# Patient Record
Sex: FEMALE | Race: WHITE | NOT HISPANIC OR LATINO | Employment: FULL TIME | URBAN - METROPOLITAN AREA
[De-identification: names, ages, dates, MRNs, and addresses within clinical notes are randomized per-mention and may not be internally consistent; named-entity substitution may affect disease eponyms.]

---

## 2017-01-22 ENCOUNTER — TELEPHONE (OUTPATIENT)
Dept: URGENT CARE | Facility: URGENT CARE | Age: 32
End: 2017-01-22

## 2017-01-22 ENCOUNTER — RADIANT APPOINTMENT (OUTPATIENT)
Dept: GENERAL RADIOLOGY | Facility: CLINIC | Age: 32
End: 2017-01-22
Attending: FAMILY MEDICINE
Payer: COMMERCIAL

## 2017-01-22 ENCOUNTER — OFFICE VISIT (OUTPATIENT)
Dept: URGENT CARE | Facility: URGENT CARE | Age: 32
End: 2017-01-22
Payer: COMMERCIAL

## 2017-01-22 VITALS
SYSTOLIC BLOOD PRESSURE: 122 MMHG | HEIGHT: 68 IN | HEART RATE: 82 BPM | DIASTOLIC BLOOD PRESSURE: 72 MMHG | OXYGEN SATURATION: 100 % | TEMPERATURE: 99.1 F

## 2017-01-22 DIAGNOSIS — M54.50 ACUTE MIDLINE LOW BACK PAIN WITHOUT SCIATICA: ICD-10-CM

## 2017-01-22 DIAGNOSIS — M54.50 ACUTE MIDLINE LOW BACK PAIN WITHOUT SCIATICA: Primary | ICD-10-CM

## 2017-01-22 PROCEDURE — 72100 X-RAY EXAM L-S SPINE 2/3 VWS: CPT

## 2017-01-22 PROCEDURE — 99213 OFFICE O/P EST LOW 20 MIN: CPT | Performed by: FAMILY MEDICINE

## 2017-01-22 RX ORDER — PHENTERMINE HYDROCHLORIDE 37.5 MG/1
37.5 TABLET ORAL
COMMUNITY
Start: 2017-01-02 | End: 2021-07-20

## 2017-01-22 RX ORDER — ACETAZOLAMIDE 250 MG/1
250 TABLET ORAL
COMMUNITY
Start: 2016-11-22 | End: 2021-07-20

## 2017-01-22 NOTE — MR AVS SNAPSHOT
After Visit Summary   1/22/2017    Gretchen Harris    MRN: [id number]           Patient Information     Date Of Birth          1985        Visit Information        Provider Department      1/22/2017 6:30 PM Severo Abdi MD [facility]        Today's Diagnoses     Acute midline low back pain without sciatica    -  1       Care Instructions    Place ice onto the painful areas of the lower back for 15 minutes at a time every 2-3 hours while awake.     Tylenol, Ibuprofen for the pain.      Place a pillow between bent knees while sleeping on your side.      follow up with the primary care provider if not better in 7-10 days.         Caring for Your Back Throughout the Day  Take care of your back throughout the day. You will likely have fewer back problems if you do. Try to warm up before you move. Shift positions often. Also do your best to form healthy habits.    Warm up for the day  Do a few slow, catlike stretches before starting your day. This simple warmup can soften your disks, stretch your back muscles, and help prevent injuries.  Shift positions often  At work and at home, change positions often. This helps keep your body from getting stiff. Stand up or lean back while you sit. If you can, get up and move every 1/2 hour.  Form healthy habits  Here are some suggestions:     Keep a healthy weight. When you weigh too much, your back is under excess strain. But losing just a few extra pounds can help a lot.    Try not to overeat. Learn about serving sizes. The size of a serving depends on the food and the food group. Many foods list serving sizes on the labels.    Handle minor aches with cold and heat. Apply cold the first 24 to 48 hours. Use heat after that. Always place a thin cloth between your skin and the source of cold or heat.    Take medicines as directed. This helps keep pain under control. Always read labels, and call your healthcare provider or pharmacist if you have any  questions.  Walk each day  A daily walk keeps your back and thigh muscles stretched and strong. This gives your back better support. Be sure to walk with your spine s three curves aligned, by keeping your head, hips, and toes connected by a vertical line.     7882-2415 The [employer]. [address] [place] [address]. All rights reserved. This information is not intended as a substitute for professional medical care. Always follow your healthcare professional's instructions.        Relieving Back Pain  Back pain is a common problem. You can strain back muscles by lifting too much weight or just by moving the wrong way. Back strain can be uncomfortable, even painful. And it can take weeks or months to improve. To help yourself feel better and prevent future back strains, try these tips.  Important Note: Do not give aspirin to children or teens without first discussing it with your healthcare provider.     Ice    Ice reduces muscle pain and swelling. It helps most during the first 24 to 48 hours after an injury.    Wrap an ice pack or a bag of frozen peas in a thin towel. (Never place ice directly on your skin.)    Place the ice where your back hurts the most.    Don t ice for more than 20 minutes at a time.    You can use ice several times a day.     Medicines  Over-the-counter pain relievers can include acetaminophen and anti-inflammatory medicines, which includes aspirin or ibuprofen. They can help ease discomfort. Some also reduce swelling.    Tell your healthcare provider about any medicines you are already taking.    Take medicines only as directed.    Heat  After the first 48 hours, heat can relax sore muscles and improve blood flow.    Try a warm bath or shower. Or use a heating pad set on low. To prevent a burn, keep a cloth between you and the heating pad.    Don t use a heating pad for more than 15 minutes at a time. Never sleep on a heating pad.    5254-6079 [employer]  [address] [place] [address]. All rights reserved. This information is not intended as a substitute for professional medical care. Always follow your healthcare professional's instructions.        Causes of Lumbar (Low Back) Pain  Low back pain can be caused by problems with any part of the lumbar spine. A disk can herniate (push out) and press on a nerve. Vertebrae can rub against each other or slip out of place. This can irritate facet joints and nerves. It can also lead to stenosis, a narrowing of the spinal canal or foramen.  Pressure from a disk  Constant wear and tear on a disk can cause it to weaken and push outward. Part of the disk may then press on nearby nerves. There are two common types of herniated disks:  Contained means the soft nucleus is protruding outward.   Extruded means the firm annulus has torn, letting the soft center squeeze through.     Pressure from bone  An unstable spine   With age, a disk may thin and wear out. Vertebrae above and below the disk may begin to touch. This can put pressure on nerves. It can also cause bone spurs (growths) to form where the bones rub together.    Stenosis results when bone spurs narrow the foramen or spinal canal. This also puts pressure on nerves. Slipping vertebrae can irritate nerves and joints. They can also worsen stenosis.    In some cases, vertebrae become unstable and slip forward. This is called spondylolisthesis.       8689-4102 The [employer]. [address] [place], [place] [address]. All rights reserved. This information is not intended as a substitute for professional medical care. Always follow your healthcare professional's instructions.              Follow-ups after your visit        Who to contact     If you have questions or need follow up information about today's clinic visit or your schedule please contact [facility] directly at [phone].  Normal or non-critical lab and imaging results will  "be communicated to you by MyChart, letter or phone within 4 business days after the clinic has received the results. If you do not hear from us within 7 days, please contact the clinic through [employer] or phone. If you have a critical or abnormal lab result, we will notify you by phone as soon as possible.  Submit refill requests through [employer] or call your pharmacy and they will forward the refill request to us. Please allow 3 business days for your refill to be completed.          Additional Information About Your Visit        Geliyoo Information     G[employer] lets you send messages to your doctor, view your test results, renew your prescriptions, schedule appointments and more. To sign up, go to [email or web address] . Click on \"Log in\" on the left side of the screen, which will take you to the Welcome page. Then click on \"Sign up Now\" on the right side of the page.     You will be asked to enter the access code listed below, as well as some personal information. Please follow the directions to create your username and password.     Your access code is: [email or web address]  Expires: 2017  7:53 PM     Your access code will  in 90 days. If you need help or a new code, please call your [facility] clinic or [phone].        Care EveryWhere ID     This is your Care EveryWhere ID. This could be used by other organizations to access your [facility] medical records  [id number]        Your Vitals Were     Pulse Temperature Height Pulse Oximetry          82 99.1  F (37.3  C) (Oral) 5' 8\" (1.727 m) 100%         Blood Pressure from Last 3 Encounters:   17 122/72   12/05/15 157/86    Weight from Last 3 Encounters:   12/05/15 282 lb (127.914 kg)                 Today's Medication Changes          These changes are accurate as of: 17  7:54 PM.  If you have any questions, ask your nurse or doctor.               Stop taking these medicines if you haven't already. Please contact your care team if you have " questions.     azithromycin 250 MG tablet   Commonly known as:  ZITHROMAX Z-ADRIAN   Stopped by:  Severo Abdi MD           Cholecalciferol 24280 UNITS Tabs   Stopped by:  Severo Abdi MD           hydrOXYzine 25 MG tablet   Commonly known as:  ATARAX   Stopped by:  Severo Abdi MD           ibuprofen 800 MG tablet   Commonly known as:  ADVIL/MOTRIN   Stopped by:  Severo Abdi MD           PLAQUENIL 200 MG tablet   Generic drug:  hydroxychloroquine   Stopped by:  Severo Abdi MD           prednisoLONE 5 MG tablet   Stopped by:  Severo Abdi MD           TOPROL XL 25 MG 24 hr tablet   Generic drug:  metoprolol   Stopped by:  Severo Abdi MD                    Primary Care Provider Office Phone # Fax #    [facility] [phone] [address] [facility] [address]        Thank you!     Thank you for choosing [facility]  for your care. Our goal is always to provide you with excellent care. Hearing back from our patients is one way we can continue to improve our services. Please take a few minutes to complete the written survey that you may receive in the mail after your visit with us. Thank you!             Your Updated Medication List - Protect others around you: Learn how to safely use, store and throw away your medicines at www.disposemymeds.org.          This list is accurate as of: 1/22/17  7:54 PM.  Always use your most recent med list.                   Brand Name Dispense Instructions for use    acetaZOLAMIDE 250 MG tablet    DIAMOX     Take 250 mg by mouth       EPIPEN 0.3 MG/0.3ML injection   Generic drug:  EPINEPHrine      Inject 0.3 mg into the muscle once as needed       phentermine 37.5 MG tablet    ADIPEX-P     Take 37.5 mg by mouth

## 2017-01-23 ENCOUNTER — TELEPHONE (OUTPATIENT)
Dept: URGENT CARE | Facility: URGENT CARE | Age: 32
End: 2017-01-23

## 2017-01-23 NOTE — PATIENT INSTRUCTIONS
Place ice onto the painful areas of the lower back for 15 minutes at a time every 2-3 hours while awake.     Tylenol, Ibuprofen for the pain.      Place a pillow between bent knees while sleeping on your side.      follow up with the primary care provider if not better in 7-10 days.         Caring for Your Back Throughout the Day  Take care of your back throughout the day. You will likely have fewer back problems if you do. Try to warm up before you move. Shift positions often. Also do your best to form healthy habits.    Warm up for the day  Do a few slow, catlike stretches before starting your day. This simple warmup can soften your disks, stretch your back muscles, and help prevent injuries.  Shift positions often  At work and at home, change positions often. This helps keep your body from getting stiff. Stand up or lean back while you sit. If you can, get up and move every 1/2 hour.  Form healthy habits  Here are some suggestions:     Keep a healthy weight. When you weigh too much, your back is under excess strain. But losing just a few extra pounds can help a lot.    Try not to overeat. Learn about serving sizes. The size of a serving depends on the food and the food group. Many foods list serving sizes on the labels.    Handle minor aches with cold and heat. Apply cold the first 24 to 48 hours. Use heat after that. Always place a thin cloth between your skin and the source of cold or heat.    Take medicines as directed. This helps keep pain under control. Always read labels, and call your healthcare provider or pharmacist if you have any questions.  Walk each day  A daily walk keeps your back and thigh muscles stretched and strong. This gives your back better support. Be sure to walk with your spine s three curves aligned, by keeping your head, hips, and toes connected by a vertical line.     7450-4285 [employer]. [address] [place] [address], [place] [address]. All rights reserved. This information  is not intended as a substitute for professional medical care. Always follow your healthcare professional's instructions.        Relieving Back Pain  Back pain is a common problem. You can strain back muscles by lifting too much weight or just by moving the wrong way. Back strain can be uncomfortable, even painful. And it can take weeks or months to improve. To help yourself feel better and prevent future back strains, try these tips.  Important Note: Do not give aspirin to children or teens without first discussing it with your healthcare provider.     Ice    Ice reduces muscle pain and swelling. It helps most during the first 24 to 48 hours after an injury.    Wrap an ice pack or a bag of frozen peas in a thin towel. (Never place ice directly on your skin.)    Place the ice where your back hurts the most.    Don t ice for more than 20 minutes at a time.    You can use ice several times a day.     Medicines  Over-the-counter pain relievers can include acetaminophen and anti-inflammatory medicines, which includes aspirin or ibuprofen. They can help ease discomfort. Some also reduce swelling.    Tell your healthcare provider about any medicines you are already taking.    Take medicines only as directed.    Heat  After the first 48 hours, heat can relax sore muscles and improve blood flow.    Try a warm bath or shower. Or use a heating pad set on low. To prevent a burn, keep a cloth between you and the heating pad.    Don t use a heating pad for more than 15 minutes at a time. Never sleep on a heating pad.    8474-1751 [employer]. [address] [place] [address], [place] [address]. All rights reserved. This information is not intended as a substitute for professional medical care. Always follow your healthcare professional's instructions.        Causes of Lumbar (Low Back) Pain  Low back pain can be caused by problems with any part of the lumbar spine. A disk can herniate (push out) and press on a nerve.  Vertebrae can rub against each other or slip out of place. This can irritate facet joints and nerves. It can also lead to stenosis, a narrowing of the spinal canal or foramen.  Pressure from a disk  Constant wear and tear on a disk can cause it to weaken and push outward. Part of the disk may then press on nearby nerves. There are two common types of herniated disks:  Contained means the soft nucleus is protruding outward.   Extruded means the firm annulus has torn, letting the soft center squeeze through.     Pressure from bone  An unstable spine   With age, a disk may thin and wear out. Vertebrae above and below the disk may begin to touch. This can put pressure on nerves. It can also cause bone spurs (growths) to form where the bones rub together.    Stenosis results when bone spurs narrow the foramen or spinal canal. This also puts pressure on nerves. Slipping vertebrae can irritate nerves and joints. They can also worsen stenosis.    In some cases, vertebrae become unstable and slip forward. This is called spondylolisthesis.       6839-0887 [employer]. [address] [place] [address], [place] [address]. All rights reserved. This information is not intended as a substitute for professional medical care. Always follow your healthcare professional's instructions.

## 2017-01-23 NOTE — TELEPHONE ENCOUNTER
"SUBJECTIVE:  I phoned the patient at around 8:30 pm today to let her know the results of the radiology report on her X-rays from today (1/22/2017).  According to the report, she has an \"L5 spondylolysis\" with \"pars defects.\"      PLAN:  I will write note for the radiology tech to make copies of the X-rays on a CD.  This will be accompanied by a copy of the radiology report.  In addition, I will write work notes with work restrictions for the patient's two jobs.      Severo Abdi MD    "

## 2017-01-23 NOTE — TELEPHONE ENCOUNTER
Called patient to let know her xray report  And work notes are ready to be picked up.  Ashley Ramsey

## 2017-01-23 NOTE — PROGRESS NOTES
"SUBJECTIVE  HPI: Gretchen Harris is a 31 year old female who presents for evaluation of constant, atraumatic midline low back pain (just above the start of the buttocks) with a sensation of knots around the lumbar spine (when the back is rubbed)  Symptoms began one week ago, have been worsening. Patient rating is 5-6 out of 10 while at rest.  Pain is located in the lumbar spine region, with radiation to none.  .  Pain is exacerbated by: bending forward, bending backward, leaning against the seat back.  Pain is relieved by: nothing[unfilled] sx include: none. .  Red flag symptoms: none. .    Past Medical History   Diagnosis Date     Connective tissue disease (H)      Conjunctival cyst of left eye      Scleritis      left eye     Unequal pupil diameter      Headache(784.0)      Current Outpatient Prescriptions   Medication Sig Dispense Refill     acetaZOLAMIDE (DIAMOX) 250 MG tablet Take 250 mg by mouth       phentermine (ADIPEX-P) 37.5 MG tablet Take 37.5 mg by mouth       EPINEPHrine (EPIPEN) 0.3 MG/0.3ML injection Inject 0.3 mg into the muscle once as needed       Social History   Substance Use Topics     Smoking status: Never Smoker      Smokeless tobacco: Not on file     Alcohol Use: Not on file     Patient teaches at [employer]    ROS:  Review of systems negative except as stated above.    OBJECTIVE:  /72 mmHg  Pulse 82  Temp(Src) 99.1  F (37.3  C) (Oral)  Ht 5' 8\" (1.727 m)  SpO2 100%  Back examination: Back symmetric, no curvature, positive findings: limitation of motion - flexion: Severe, extension: Severe, rotation: Marked and lateral bending: Marked, tenderness to palpation over the spinous processes of the lumbar spine.  I was unable to palpate masses over the lumbar spine.   [unfilled] leg raise test: negative  GENERAL APPEARANCE: healthy, alert and no distress  NEURO: Normal strength and tone, sensory exam grossly normal, mentation intact, speech normal, gait normal including " heel/toe/tandem walking, light touch normal, normal strength throughout and deep tendon reflexes 1/4 at the patellas and Achilles.   SKIN: no suspicious lesions or rashes    X-rays of the lumbar spine were within normal limits.  No obvious spondylolisthesis/degenerative changes/fractures/dislocations.    ASSESSMENT/IMPRESSION:  Acute midline low back pain without sciatica.  Unknown etiology, especially without an obvious injury. Possible small disc herniation as a cause.     PLAN:  1) Ice  2) Tylenol, ibuprofen  3) follow up with a primary care provider in 7-10 days if not better.     Severo Abdi MD

## 2017-12-17 ENCOUNTER — HEALTH MAINTENANCE LETTER (OUTPATIENT)
Age: 32
End: 2017-12-17

## 2019-05-18 ENCOUNTER — OFFICE VISIT (OUTPATIENT)
Dept: URGENT CARE | Facility: URGENT CARE | Age: 34
End: 2019-05-18
Payer: COMMERCIAL

## 2019-05-18 VITALS
OXYGEN SATURATION: 99 % | WEIGHT: 280 LBS | TEMPERATURE: 97.8 F | DIASTOLIC BLOOD PRESSURE: 77 MMHG | SYSTOLIC BLOOD PRESSURE: 127 MMHG | BODY MASS INDEX: 42.57 KG/M2 | HEART RATE: 100 BPM

## 2019-05-18 DIAGNOSIS — M79.672 HEEL PAIN, BILATERAL: Primary | ICD-10-CM

## 2019-05-18 DIAGNOSIS — M79.671 HEEL PAIN, BILATERAL: Primary | ICD-10-CM

## 2019-05-18 DIAGNOSIS — H92.03 OTALGIA, BILATERAL: ICD-10-CM

## 2019-05-18 PROCEDURE — 99213 OFFICE O/P EST LOW 20 MIN: CPT | Performed by: FAMILY MEDICINE

## 2019-05-18 RX ORDER — FUROSEMIDE 20 MG
20-40 TABLET ORAL
COMMUNITY
Start: 2019-05-10 | End: 2021-07-20

## 2019-05-18 RX ORDER — VERAPAMIL HYDROCHLORIDE 120 MG/1
120 TABLET, FILM COATED, EXTENDED RELEASE ORAL
COMMUNITY
Start: 2019-05-14 | End: 2021-07-20

## 2019-05-19 NOTE — PROGRESS NOTES
"SUBJECTIVE:   Gretchen Harris is a 33 year old female presenting with multiple complaints:  Chief Complaint   Patient presents with     Urgent Care     Ear Problem     c/o ear pain for 3 days     Foot Pain     c/o foot pain for 1 week , no injury     (1)  Outer ear canal pain, right more than left, for the past 3 days.  Feeling of being plugged, decreased hearing and feeling of \"water in my ears\".   Has not been swimming recently.  Has had swimmers ear in the past.   No fevers.  No cough or cold symptoms.  No post nasal drainage.   Had a sore throat and a canker sore of the soft palate earlier this week, seen by her PCP, but feeling much better now.    (2)  Heel pain of both feet, but worse on right foot for the past week.   Hurts when she first gets out of bed for several steps and improves over course of the day.   No injury.  No redness or swelling.  No c/o numbness/tingling.  Has been wearing flats/sandals and other non supportive footware with change in weather.        OBJECTIVE  /77   Pulse 100   Temp 97.8  F (36.6  C) (Temporal)   Wt 127 kg (280 lb)   LMP 05/11/2019   SpO2 99%   BMI 42.57 kg/m    GENERAL:  Awake, alert and interactive. No acute distress.  HEENT:   NC/AT, EOMI, clear conjunctiva.  Nose clear.  Oropharynx moist and clear.  TM's and EAC's benign.  No dry/flaking skin or sign of irritation or inflammation within the EAC's.  Pinnas benign in appearance.  FEET:  No swelling, bruising, erythema, rashes, or lesions noted.  Skin intact.  Mildly tender to anterior aspect of heel with toes in dorsiflexion.  No compromise to distal circulation.        ASSESSMENT/PLAN    ICD-10-CM    1. Heel pain, bilateral M79.671     M79.672    2. Otalgia, bilateral H92.03      Heel pain suspect for plantar fascitis with flat sandals without any cushioning or arch support.  Reviewed symptomatic cares in detail for this.     No pathology noted in either EAC and no large effusions noted behind either TM.  " Recheck if symptoms not resolving over next few days.    Patient Instructions     For the heel pain -- avoid going barefoot even in the house.   Good supportive shoes while awake.   Foot/stretch before getting out of bed if desired.   Ibuprofen or aleve (take with food).  Ice, gel insert for the shoes, avoid aggravating activities -- squatting down, etc.   Recheck with your primary provider or podiatry if not starting to improve over the next 2 weeks, sooner if any worsening symptoms develop.    For the ears.  The ibuprofen or aleve you will be taking for your heel pain will help with your discomfort.  If not resolving over the next few days or if any worsening, recheck with your primary provider.        Patient Education     Understanding Heel Pain    Your heel is the back part of your foot. A band of tissue called the plantar fascia connects the heel bone to the bones in the ball of your foot. Nerves run from the heel up the inside of your ankle and into your leg. When you feel pain in the bottom of your heel, the plantar fascia may be inflamed. Overuse, Achilles tightness, and excess body weight can cause the tissue to tear or pull away from the bone. Sometimes the inflamed plantar fascia also irritates a nerve, causing more pain.  What causes heel pain?  Wearing shoes with poor cushioning can irritate the tissue in your heel (plantar fascia). Being overweight or standing for long periods can also irritate the tissue. Running, walking, tennis, and other sports that put stress on the heels can cause tiny tears in the tissue. If your lower leg muscles are tight, this is more likely to happen. A tight Achilles tendon will also contribute to heel pain.  Symptoms  You may feel pain on the bottom or on the inside edge of your heel. The pain may be sharp when you get out of bed or when you stand up after sitting for a while. You may feel a dull ache in your heel after you ve been standing for a long time on a hard  surface. Running can also cause a dull ache.  Preventing future problems  To prevent future heel pain, wear shoes with well-cushioned heels. And do exercises prescribed by your healthcare provider to stretch the plantar fascia and the muscles in the lower leg.   Date Last Reviewed: 10/1/2017    2541-2849 [employer]. [address] [place] [address], [place] [address]. All rights reserved. This information is not intended as a substitute for professional medical care. Always follow your healthcare professional's instructions.

## 2019-05-19 NOTE — PATIENT INSTRUCTIONS
For the heel pain -- avoid going barefoot even in the house.   Good supportive shoes while awake.   Foot/stretch before getting out of bed if desired.   Ibuprofen or aleve (take with food).  Ice, gel insert for the shoes, avoid aggravating activities -- squatting down, etc.   Recheck with your primary provider or podiatry if not starting to improve over the next 2 weeks, sooner if any worsening symptoms develop.    For the ears.  The ibuprofen or aleve you will be taking for your heel pain will help with your discomfort.  If not resolving over the next few days or if any worsening, recheck with your primary provider.        Patient Education     Understanding Heel Pain    Your heel is the back part of your foot. A band of tissue called the plantar fascia connects the heel bone to the bones in the ball of your foot. Nerves run from the heel up the inside of your ankle and into your leg. When you feel pain in the bottom of your heel, the plantar fascia may be inflamed. Overuse, Achilles tightness, and excess body weight can cause the tissue to tear or pull away from the bone. Sometimes the inflamed plantar fascia also irritates a nerve, causing more pain.  What causes heel pain?  Wearing shoes with poor cushioning can irritate the tissue in your heel (plantar fascia). Being overweight or standing for long periods can also irritate the tissue. Running, walking, tennis, and other sports that put stress on the heels can cause tiny tears in the tissue. If your lower leg muscles are tight, this is more likely to happen. A tight Achilles tendon will also contribute to heel pain.  Symptoms  You may feel pain on the bottom or on the inside edge of your heel. The pain may be sharp when you get out of bed or when you stand up after sitting for a while. You may feel a dull ache in your heel after you ve been standing for a long time on a hard surface. Running can also cause a dull ache.  Preventing future problems  To prevent  future heel pain, wear shoes with well-cushioned heels. And do exercises prescribed by your healthcare provider to stretch the plantar fascia and the muscles in the lower leg.   Date Last Reviewed: 10/1/2017    2542-9062 [employer] [address] [place] [address], [place] [address]. All rights reserved. This information is not intended as a substitute for professional medical care. Always follow your healthcare professional's instructions.

## 2020-01-31 ENCOUNTER — APPOINTMENT (OUTPATIENT)
Dept: GENERAL RADIOLOGY | Facility: CLINIC | Age: 35
End: 2020-01-31
Attending: PHYSICIAN ASSISTANT
Payer: COMMERCIAL

## 2020-01-31 ENCOUNTER — HOSPITAL ENCOUNTER (EMERGENCY)
Facility: CLINIC | Age: 35
Discharge: HOME | End: 2020-02-01
Attending: PHYSICIAN ASSISTANT | Admitting: PHYSICIAN ASSISTANT
Payer: COMMERCIAL

## 2020-01-31 DIAGNOSIS — J11.1 INFLUENZA-LIKE ILLNESS: ICD-10-CM

## 2020-01-31 LAB
ANION GAP SERPL CALCULATED.3IONS-SCNC: 3 MMOL/L (ref 3–14)
BASOPHILS # BLD AUTO: 0 10E9/L (ref 0–0.2)
BASOPHILS NFR BLD AUTO: 0.4 %
BUN SERPL-MCNC: 15 MG/DL (ref 7–30)
CALCIUM SERPL-MCNC: 9 MG/DL (ref 8.5–10.1)
CHLORIDE SERPL-SCNC: 111 MMOL/L (ref 94–109)
CO2 SERPL-SCNC: 27 MMOL/L (ref 20–32)
CREAT SERPL-MCNC: 0.73 MG/DL (ref 0.52–1.04)
CRP SERPL-MCNC: 104 MG/L (ref 0–8)
DEPRECATED S PYO AG THROAT QL EIA: NORMAL
DIFFERENTIAL METHOD BLD: NORMAL
EOSINOPHIL NFR BLD AUTO: 2.1 %
ERYTHROCYTE [DISTWIDTH] IN BLOOD BY AUTOMATED COUNT: 14.9 % (ref 10–15)
FLUAV+FLUBV AG SPEC QL: NEGATIVE
FLUAV+FLUBV AG SPEC QL: NEGATIVE
GFR SERPL CREATININE-BSD FRML MDRD: >90 ML/MIN/{1.73_M2}
GLUCOSE SERPL-MCNC: 103 MG/DL (ref 70–99)
HCT VFR BLD AUTO: 39.3 % (ref 35–47)
HGB BLD-MCNC: 12.5 G/DL (ref 11.7–15.7)
IMM GRANULOCYTES # BLD: 0 10E9/L (ref 0–0.4)
IMM GRANULOCYTES NFR BLD: 0.4 %
LYMPHOCYTES # BLD AUTO: 2.2 10E9/L (ref 0.8–5.3)
LYMPHOCYTES NFR BLD AUTO: 32.3 %
MCH RBC QN AUTO: 26.7 PG (ref 26.5–33)
MCHC RBC AUTO-ENTMCNC: 31.8 G/DL (ref 31.5–36.5)
MCV RBC AUTO: 84 FL (ref 78–100)
MONOCYTES # BLD AUTO: 0.7 10E9/L (ref 0–1.3)
MONOCYTES NFR BLD AUTO: 10.4 %
NEUTROPHILS # BLD AUTO: 3.7 10E9/L (ref 1.6–8.3)
NEUTROPHILS NFR BLD AUTO: 54.4 %
NRBC # BLD AUTO: 0 10*3/UL
NRBC BLD AUTO-RTO: 0 /100
PLATELET # BLD AUTO: 274 10E9/L (ref 150–450)
POTASSIUM SERPL-SCNC: 4.2 MMOL/L (ref 3.4–5.3)
RBC # BLD AUTO: 4.69 10E12/L (ref 3.8–5.2)
SODIUM SERPL-SCNC: 141 MMOL/L (ref 133–144)
SPECIMEN SOURCE: NORMAL
SPECIMEN SOURCE: NORMAL
WBC # BLD AUTO: 6.8 10E9/L (ref 4–11)

## 2020-01-31 PROCEDURE — 99284 EMERGENCY DEPT VISIT MOD MDM: CPT | Mod: 25 | Performed by: PHYSICIAN ASSISTANT

## 2020-01-31 PROCEDURE — 86140 C-REACTIVE PROTEIN: CPT | Performed by: PHYSICIAN ASSISTANT

## 2020-01-31 PROCEDURE — 87081 CULTURE SCREEN ONLY: CPT | Performed by: PHYSICIAN ASSISTANT

## 2020-01-31 PROCEDURE — 85025 COMPLETE CBC W/AUTO DIFF WBC: CPT | Performed by: PHYSICIAN ASSISTANT

## 2020-01-31 PROCEDURE — 80048 BASIC METABOLIC PNL TOTAL CA: CPT | Performed by: PHYSICIAN ASSISTANT

## 2020-01-31 PROCEDURE — 25000132 ZZH RX MED GY IP 250 OP 250 PS 637: Performed by: PHYSICIAN ASSISTANT

## 2020-01-31 PROCEDURE — 87804 INFLUENZA ASSAY W/OPTIC: CPT | Performed by: PHYSICIAN ASSISTANT

## 2020-01-31 PROCEDURE — 87880 STREP A ASSAY W/OPTIC: CPT | Performed by: PHYSICIAN ASSISTANT

## 2020-01-31 PROCEDURE — 99284 EMERGENCY DEPT VISIT MOD MDM: CPT | Mod: Z6 | Performed by: PHYSICIAN ASSISTANT

## 2020-01-31 PROCEDURE — 71046 X-RAY EXAM CHEST 2 VIEWS: CPT | Mod: TC

## 2020-01-31 RX ORDER — ACETAMINOPHEN 325 MG/1
975 TABLET ORAL ONCE
Status: COMPLETED | OUTPATIENT
Start: 2020-01-31 | End: 2020-01-31

## 2020-01-31 RX ADMIN — ACETAMINOPHEN 975 MG: 325 TABLET, FILM COATED ORAL at 23:24

## 2020-01-31 NOTE — ED AVS SNAPSHOT
[facility] Emergency Department  [address] [place] [address]  Phone:  [phone]  Fax:  [phone]                                    Gretchen Harris   MRN: [id number]    Department:  [facility] Emergency Department   Date of Visit:  1/31/2020           After Visit Summary Signature Page    I have received my discharge instructions, and my questions have been answered. I have discussed any challenges I see with this plan with the nurse or doctor.    ..........................................................................................................................................  Patient/Patient Representative Signature      ..........................................................................................................................................  Patient Representative Print Name and Relationship to Patient    ..................................................               ................................................  Date                                   Time    ..........................................................................................................................................  Reviewed by Signature/Title    ...................................................              ..............................................  Date                                               Time          22EPIC Rev 08/18

## 2020-02-01 VITALS
OXYGEN SATURATION: 95 % | WEIGHT: 193 LBS | RESPIRATION RATE: 18 BRPM | DIASTOLIC BLOOD PRESSURE: 88 MMHG | HEART RATE: 92 BPM | TEMPERATURE: 98.7 F | BODY MASS INDEX: 29.35 KG/M2 | SYSTOLIC BLOOD PRESSURE: 131 MMHG

## 2020-02-01 LAB
ALBUMIN UR-MCNC: NEGATIVE MG/DL
AMORPH CRY #/AREA URNS HPF: ABNORMAL /HPF
APPEARANCE UR: ABNORMAL
BACTERIA #/AREA URNS HPF: ABNORMAL /HPF
BILIRUB UR QL STRIP: NEGATIVE
COLOR UR AUTO: YELLOW
GLUCOSE UR STRIP-MCNC: NEGATIVE MG/DL
HGB UR QL STRIP: NEGATIVE
KETONES UR STRIP-MCNC: NEGATIVE MG/DL
LEUKOCYTE ESTERASE UR QL STRIP: NEGATIVE
MUCOUS THREADS #/AREA URNS LPF: PRESENT /LPF
NITRATE UR QL: NEGATIVE
PH UR STRIP: 7 PH (ref 5–7)
RBC #/AREA URNS AUTO: 0 /HPF (ref 0–2)
SOURCE: ABNORMAL
SP GR UR STRIP: 1.02 (ref 1–1.03)
SQUAMOUS #/AREA URNS AUTO: 6 /HPF (ref 0–1)
UROBILINOGEN UR STRIP-MCNC: 0 MG/DL (ref 0–2)
WBC #/AREA URNS AUTO: 4 /HPF (ref 0–5)

## 2020-02-01 PROCEDURE — 81001 URINALYSIS AUTO W/SCOPE: CPT | Performed by: PHYSICIAN ASSISTANT

## 2020-02-01 NOTE — ED PROVIDER NOTES
History     Chief Complaint   Patient presents with     Fever     Headache     HPI  Gretchen Harris is a 34 year old female who presents for evaluation of an ongoing cough for the past 6 weeks.  Developed a fever up to 102 yesterday associated with a generalized pounding headache which she rates 5 on a scale of 10.  Has had some neck discomfort off and on for the past 2 days which was worse with head movement.  Generalized myalgias have been an issue as well.  Cough has been persistent for the past 6 weeks.  Treated for bronchitis with antibiotics without initial improvement.  She thought that the cough was improving over the past 1 week, but now she has had escalation of other symptoms as well.  She reports finishing oral antibiotics 3 days ago for swimmer's ear.  This was given at an outside clinic, and I cannot review what was provided.  She has treated her fever and myalgias with ibuprofen with improvement.  Reports a past history of chronic/recurrent headaches treated by neurology.  She denies any upper extremity numbness or tingling.  No weakness.  No recent travel.  No ill contacts.        Allergies:  Allergies   Allergen Reactions     Amoxicillin Hives and Swelling     Clindamycin Hives, Swelling, Anaphylaxis and Rash     Tape [Adhesive Tape]        Problem List:    There are no active problems to display for this patient.       Past Medical History:    Past Medical History:   Diagnosis Date     Conjunctival cyst of left eye      Connective tissue disease (H)      Headache(784.0)      Scleritis      Unequal pupil diameter        Past Surgical History:    Past Surgical History:   Procedure Laterality Date     DENTAL SURGERY      wisdom teeth extr     TONSILLECTOMY & ADENOIDECTOMY  02/16/2011       Family History:    Family History   Problem Relation Age of Onset     Diabetes Maternal Grandfather      Dementia Maternal Grandfather      Thyroid Disease Maternal Grandmother         hyperthyroid     Chronic  Obstructive Pulmonary Disease Maternal Grandmother         smoker     Gallbladder Disease Maternal Grandmother      Aneurysm Paternal Grandmother      Aneurysm Paternal Grandfather      Thyroid Disease Maternal Aunt         hypothyroid       Social History:  Marital Status:  Single [1]  Social History     Tobacco Use     Smoking status: Never Smoker     Smokeless tobacco: Never Used   Substance Use Topics     Alcohol use: Not Currently     Drug use: Never        Medications:    acetaZOLAMIDE (DIAMOX) 250 MG tablet  EPINEPHrine (EPIPEN) 0.3 MG/0.3ML injection  furosemide (LASIX) 20 MG tablet  phentermine (ADIPEX-P) 37.5 MG tablet  verapamil ER (CALAN-SR) 120 MG CR tablet          Review of Systems   All other systems reviewed and are negative.      Physical Exam   BP: (!) 158/85  Pulse: 99  Heart Rate: 99  Temp: 98.7  F (37.1  C)  Resp: 18  Weight: 87.5 kg (193 lb)  SpO2: 98 %      Physical Exam  Vitals signs and nursing note reviewed.   Constitutional:       General: She is not in acute distress.     Appearance: She is not diaphoretic.   HENT:      Head: Normocephalic and atraumatic.      Right Ear: Tympanic membrane, ear canal and external ear normal.      Left Ear: Tympanic membrane, ear canal and external ear normal.      Nose: Congestion present. No rhinorrhea.      Mouth/Throat:      Mouth: Mucous membranes are moist.      Pharynx: No oropharyngeal exudate or posterior oropharyngeal erythema.   Eyes:      General: No scleral icterus.        Right eye: No discharge.         Left eye: No discharge.      Conjunctiva/sclera: Conjunctivae normal.      Pupils: Pupils are equal, round, and reactive to light.   Neck:      Musculoskeletal: Normal range of motion and neck supple. Muscular tenderness (Bilateral paracervical muscular tenderness along with bilateral trapezius discomfort.) present. No neck rigidity, pain with movement or spinous process tenderness.      Thyroid: No thyromegaly.      Meningeal: Brudzinski's  sign and Kernig's sign absent.   Cardiovascular:      Rate and Rhythm: Normal rate and regular rhythm.      Heart sounds: Normal heart sounds. No murmur.   Pulmonary:      Effort: Pulmonary effort is normal. No respiratory distress.      Breath sounds: Normal breath sounds. No wheezing or rales.   Chest:      Chest wall: No tenderness.   Abdominal:      General: Bowel sounds are normal. There is no distension.      Palpations: Abdomen is soft. There is no mass.      Tenderness: There is no abdominal tenderness. There is no right CVA tenderness, left CVA tenderness, guarding or rebound.   Musculoskeletal: Normal range of motion.         General: No swelling, tenderness, deformity or signs of injury.      Right lower leg: No edema.      Left lower leg: No edema.   Lymphadenopathy:      Cervical: No cervical adenopathy.   Skin:     General: Skin is warm and dry.      Capillary Refill: Capillary refill takes less than 2 seconds.      Coloration: Skin is not jaundiced.      Findings: No erythema or rash.   Neurological:      General: No focal deficit present.      Mental Status: She is alert and oriented to person, place, and time. Mental status is at baseline.      GCS: GCS eye subscore is 4. GCS verbal subscore is 5. GCS motor subscore is 6.      Cranial Nerves: Cranial nerves are intact. No cranial nerve deficit.      Sensory: Sensation is intact.      Motor: Motor function is intact. No weakness, tremor or abnormal muscle tone.      Coordination: Coordination is intact. Romberg sign negative. Finger-Nose-Finger Test normal. Rapid alternating movements normal.      Gait: Gait is intact.      Deep Tendon Reflexes: Reflexes are normal and symmetric.   Psychiatric:         Behavior: Behavior normal.         Thought Content: Thought content normal.         ED Course        Procedures               Critical Care time:  none               Results for orders placed or performed during the hospital encounter of 01/31/20 (from  the past 24 hour(s))   Influenza A/B antigen   Result Value Ref Range    Influenza A/B Agn Specimen Nasopharyngeal     Influenza A Negative NEG^Negative    Influenza B Negative NEG^Negative   Rapid strep screen   Result Value Ref Range    Specimen Description Throat     Rapid Strep A Screen       NEGATIVE: No Group A streptococcal antigen detected by immunoassay, await culture report.   CBC with platelets differential   Result Value Ref Range    WBC 6.8 4.0 - 11.0 10e9/L    RBC Count 4.69 3.8 - 5.2 10e12/L    Hemoglobin 12.5 11.7 - 15.7 g/dL    Hematocrit 39.3 35.0 - 47.0 %    MCV 84 78 - 100 fl    MCH 26.7 26.5 - 33.0 pg    MCHC 31.8 31.5 - 36.5 g/dL    RDW 14.9 10.0 - 15.0 %    Platelet Count 274 150 - 450 10e9/L    Diff Method Automated Method     % Neutrophils 54.4 %    % Lymphocytes 32.3 %    % Monocytes 10.4 %    % Eosinophils 2.1 %    % Basophils 0.4 %    % Immature Granulocytes 0.4 %    Nucleated RBCs 0 0 /100    Absolute Neutrophil 3.7 1.6 - 8.3 10e9/L    Absolute Lymphocytes 2.2 0.8 - 5.3 10e9/L    Absolute Monocytes 0.7 0.0 - 1.3 10e9/L    Absolute Basophils 0.0 0.0 - 0.2 10e9/L    Abs Immature Granulocytes 0.0 0 - 0.4 10e9/L    Absolute Nucleated RBC 0.0    Basic metabolic panel   Result Value Ref Range    Sodium 141 133 - 144 mmol/L    Potassium 4.2 3.4 - 5.3 mmol/L    Chloride 111 (H) 94 - 109 mmol/L    Carbon Dioxide 27 20 - 32 mmol/L    Anion Gap 3 3 - 14 mmol/L    Glucose 103 (H) 70 - 99 mg/dL    Urea Nitrogen 15 7 - 30 mg/dL    Creatinine 0.73 0.52 - 1.04 mg/dL    GFR Estimate >90 >60 mL/min/[1.73_m2]    GFR Estimate If Black >90 >60 mL/min/[1.73_m2]    Calcium 9.0 8.5 - 10.1 mg/dL   CRP inflammation   Result Value Ref Range    CRP Inflammation 104.0 (H) 0.0 - 8.0 mg/L   XR Chest 2 Views    Narrative    EXAM: XR CHEST 2 VW  LOCATION: [facility]  DATE/TIME: 1/31/2020 11:27 PM    INDICATION: Cough for 6 weeks. Fever.  COMPARISON: None.    FINDINGS: The heart size is normal. The lungs  are clear. No pneumothorax or pleural effusion.      Impression    IMPRESSION: No acute abnormality.   UA reflex to Microscopic and Culture   Result Value Ref Range    Color Urine Yellow     Appearance Urine Cloudy     Glucose Urine Negative NEG^Negative mg/dL    Bilirubin Urine Negative NEG^Negative    Ketones Urine Negative NEG^Negative mg/dL    Specific Gravity Urine 1.019 1.003 - 1.035    Blood Urine Negative NEG^Negative    pH Urine 7.0 5.0 - 7.0 pH    Protein Albumin Urine Negative NEG^Negative mg/dL    Urobilinogen mg/dL 0.0 0.0 - 2.0 mg/dL    Nitrite Urine Negative NEG^Negative    Leukocyte Esterase Urine Negative NEG^Negative    Source Midstream Urine     RBC Urine 0 0 - 2 /HPF    WBC Urine 4 0 - 5 /HPF    Bacteria Urine Few (A) NEG^Negative /HPF    Squamous Epithelial /HPF Urine 6 (H) 0 - 1 /HPF    Mucous Urine Present (A) NEG^Negative /LPF    Amorphous Crystals Many (A) NEG^Negative /HPF       Medications   acetaminophen (TYLENOL) tablet 975 mg (975 mg Oral Given 1/31/20 7861)       Assessments & Plan (with Medical Decision Making)  Influenza-like illness     34 year old female presents for evaluation of headache, neck discomfort, fever up to 102.0, and generalized myalgia for the past 2 days associated with an ongoing cough for the past 6 weeks.  Just finished antibiotics 3 days ago for a case of otitis externa per patient report.  Taking ibuprofen as needed for symptoms.  No recent travel.  No other ill contacts.  On exam blood pressure 158/85, temperature 98.7, respiration 18, and oxygen saturation 98% on room air.  Patient appears well.   Negative Brudzinski's and Kernig sign.  She does have some para vertebral cervical muscular tenderness and bilateral trapezius muscle tenderness upon palpation.  No sign of nuchal rigidity.  Concern for some coarse breath sounds in the left lower posterior and possible rhonchi with deep inspiration.  Remainder the exam otherwise normal.  Chest x-ray without  infiltrate.  White blood cell count normal and actually at the low end of 6800 with neutrophil of 54%, lymphocyte 32% and monocyte 10%.  Influenza and rapid strep test negative.  Basic metabolic panel all within normal limits and CRP elevated at 104.  Given her laboratory, x-ray, exam findings, I am most concerned about a viral illness.  This is likely what is causing her CRP elevation.  She does not have any nuchal rigidity.  Symptoms and exam not consistent with meningitis.  No infiltrate on chest x-ray.  Urinalysis without nitrite or leukocyte esterase.  Appears to be contaminated specimen with bacteria, squamous epithelial cells, and mucus.  Patient was reassured.  It appears that she has come down with an influenza-like illness in the past 2 days.  Conservative care management discussed.  Push clear fluids.  Increase rest.  Ibuprofen or Tylenol as needed for symptoms.  Indications for return reviewed.  Patient was in agreement with this plan and was suitable for discharge.     I have reviewed the nursing notes.    I have reviewed the findings, diagnosis, plan and need for follow up with the patient.       Discharge Medication List as of 2/1/2020 12:40 AM          Final diagnoses:   Influenza-like illness       Disclaimer: This note consists of symbols derived from keyboarding, dictation and/or voice recognition software. As a result, there may be errors in the script that have gone undetected. Please consider this when interpreting information found in this chart.      1/31/2020   Chava Parmar PA-C   [facility] EMERGENCY DEPARTMENT     Chava Parmar PA-C  02/01/20 0115

## 2020-02-01 NOTE — DISCHARGE INSTRUCTIONS
It was a pleasure working with you today!  I hope your condition improves rapidly!     All of your testing was reassuring this evening.  Based on your laboratory testing and current symptoms, I am concerned that you have a viral illness.  Your symptoms sound like influenza, and your nasal swab could be a false negative.  Unfortunately, there are no cures for influenza.  Your immune system will have to fight this off over the coming days.  You are contagious while you are experiencing a fever.  It is okay to treat your fever with alternating Tylenol and ibuprofen.  Make sure you are drinking at least 8 glasses of water per day to maintain adequate hydration.

## 2020-02-03 LAB
BACTERIA SPEC CULT: NORMAL
SPECIMEN SOURCE: NORMAL

## 2020-02-03 NOTE — RESULT ENCOUNTER NOTE
Final Beta strep group A r/o culture is NEGATIVE for Group A streptococcus.    No treatment or change in treatment per [place] Strep protocol.

## 2020-03-02 ENCOUNTER — HEALTH MAINTENANCE LETTER (OUTPATIENT)
Age: 35
End: 2020-03-02

## 2020-12-20 ENCOUNTER — HEALTH MAINTENANCE LETTER (OUTPATIENT)
Age: 35
End: 2020-12-20

## 2021-04-11 ENCOUNTER — HOSPITAL ENCOUNTER (EMERGENCY)
Facility: CLINIC | Age: 36
Discharge: HOME | End: 2021-04-12
Attending: PHYSICIAN ASSISTANT | Admitting: PHYSICIAN ASSISTANT
Payer: COMMERCIAL

## 2021-04-11 ENCOUNTER — APPOINTMENT (OUTPATIENT)
Dept: CT IMAGING | Facility: CLINIC | Age: 36
End: 2021-04-11
Attending: PHYSICIAN ASSISTANT
Payer: COMMERCIAL

## 2021-04-11 DIAGNOSIS — R00.2 PALPITATIONS: ICD-10-CM

## 2021-04-11 DIAGNOSIS — R07.89 ATYPICAL CHEST PAIN: ICD-10-CM

## 2021-04-11 LAB
ALBUMIN SERPL-MCNC: 3.3 G/DL (ref 3.4–5)
ALP SERPL-CCNC: 68 U/L (ref 40–150)
ALT SERPL W P-5'-P-CCNC: 33 U/L (ref 0–50)
ANION GAP SERPL CALCULATED.3IONS-SCNC: 7 MMOL/L (ref 3–14)
AST SERPL W P-5'-P-CCNC: 18 U/L (ref 0–45)
BASOPHILS # BLD AUTO: 0 10E9/L (ref 0–0.2)
BASOPHILS NFR BLD AUTO: 0.4 %
BILIRUB SERPL-MCNC: 0.3 MG/DL (ref 0.2–1.3)
BUN SERPL-MCNC: 22 MG/DL (ref 7–30)
CALCIUM SERPL-MCNC: 9 MG/DL (ref 8.5–10.1)
CHLORIDE SERPL-SCNC: 109 MMOL/L (ref 94–109)
CO2 SERPL-SCNC: 24 MMOL/L (ref 20–32)
CREAT SERPL-MCNC: 0.83 MG/DL (ref 0.52–1.04)
D DIMER PPP FEU-MCNC: 1.2 UG/ML FEU (ref 0–0.5)
DIFFERENTIAL METHOD BLD: NORMAL
EOSINOPHIL # BLD AUTO: 0.2 10E9/L (ref 0–0.7)
EOSINOPHIL NFR BLD AUTO: 2.6 %
ERYTHROCYTE [DISTWIDTH] IN BLOOD BY AUTOMATED COUNT: 14.5 % (ref 10–15)
GFR SERPL CREATININE-BSD FRML MDRD: >90 ML/MIN/{1.73_M2}
GLUCOSE SERPL-MCNC: 89 MG/DL (ref 70–99)
HCT VFR BLD AUTO: 35.4 % (ref 35–47)
HGB BLD-MCNC: 11.7 G/DL (ref 11.7–15.7)
IMM GRANULOCYTES # BLD: 0 10E9/L (ref 0–0.4)
IMM GRANULOCYTES NFR BLD: 0.1 %
LIPASE SERPL-CCNC: 244 U/L (ref 73–393)
LYMPHOCYTES # BLD AUTO: 2.2 10E9/L (ref 0.8–5.3)
LYMPHOCYTES NFR BLD AUTO: 29.3 %
MCH RBC QN AUTO: 27.1 PG (ref 26.5–33)
MCHC RBC AUTO-ENTMCNC: 33.1 G/DL (ref 31.5–36.5)
MCV RBC AUTO: 82 FL (ref 78–100)
MONOCYTES # BLD AUTO: 0.9 10E9/L (ref 0–1.3)
MONOCYTES NFR BLD AUTO: 12 %
NEUTROPHILS # BLD AUTO: 4.1 10E9/L (ref 1.6–8.3)
NEUTROPHILS NFR BLD AUTO: 55.6 %
NRBC # BLD AUTO: 0 10*3/UL
NRBC BLD AUTO-RTO: 0 /100
PLATELET # BLD AUTO: 227 10E9/L (ref 150–450)
POTASSIUM SERPL-SCNC: 3.6 MMOL/L (ref 3.4–5.3)
PROT SERPL-MCNC: 7.3 G/DL (ref 6.8–8.8)
RBC # BLD AUTO: 4.32 10E12/L (ref 3.8–5.2)
SODIUM SERPL-SCNC: 140 MMOL/L (ref 133–144)
TROPONIN I SERPL-MCNC: <0.015 UG/L (ref 0–0.04)
WBC # BLD AUTO: 7.4 10E9/L (ref 4–11)

## 2021-04-11 PROCEDURE — 85379 FIBRIN DEGRADATION QUANT: CPT | Performed by: PHYSICIAN ASSISTANT

## 2021-04-11 PROCEDURE — 85025 COMPLETE CBC W/AUTO DIFF WBC: CPT | Performed by: PHYSICIAN ASSISTANT

## 2021-04-11 PROCEDURE — 84703 CHORIONIC GONADOTROPIN ASSAY: CPT | Performed by: PHYSICIAN ASSISTANT

## 2021-04-11 PROCEDURE — 93005 ELECTROCARDIOGRAM TRACING: CPT | Performed by: PHYSICIAN ASSISTANT

## 2021-04-11 PROCEDURE — 84443 ASSAY THYROID STIM HORMONE: CPT | Performed by: PHYSICIAN ASSISTANT

## 2021-04-11 PROCEDURE — 99285 EMERGENCY DEPT VISIT HI MDM: CPT | Mod: 25 | Performed by: PHYSICIAN ASSISTANT

## 2021-04-11 PROCEDURE — 93010 ELECTROCARDIOGRAM REPORT: CPT | Performed by: PHYSICIAN ASSISTANT

## 2021-04-11 PROCEDURE — 83690 ASSAY OF LIPASE: CPT | Performed by: PHYSICIAN ASSISTANT

## 2021-04-11 PROCEDURE — 71275 CT ANGIOGRAPHY CHEST: CPT

## 2021-04-11 PROCEDURE — 84484 ASSAY OF TROPONIN QUANT: CPT | Performed by: PHYSICIAN ASSISTANT

## 2021-04-11 PROCEDURE — 80053 COMPREHEN METABOLIC PANEL: CPT | Performed by: PHYSICIAN ASSISTANT

## 2021-04-11 RX ORDER — IOPAMIDOL 755 MG/ML
100 INJECTION, SOLUTION INTRAVASCULAR ONCE
Status: COMPLETED | OUTPATIENT
Start: 2021-04-11 | End: 2021-04-12

## 2021-04-12 VITALS
WEIGHT: 289 LBS | OXYGEN SATURATION: 97 % | RESPIRATION RATE: 16 BRPM | SYSTOLIC BLOOD PRESSURE: 142 MMHG | DIASTOLIC BLOOD PRESSURE: 87 MMHG | TEMPERATURE: 97.7 F | BODY MASS INDEX: 43.94 KG/M2 | HEART RATE: 90 BPM

## 2021-04-12 LAB
HCG SERPL QL: NEGATIVE
TSH SERPL DL<=0.005 MIU/L-ACNC: 2.59 MU/L (ref 0.4–4)

## 2021-04-12 PROCEDURE — 250N000011 HC RX IP 250 OP 636: Performed by: PHYSICIAN ASSISTANT

## 2021-04-12 PROCEDURE — 250N000009 HC RX 250: Performed by: PHYSICIAN ASSISTANT

## 2021-04-12 RX ADMIN — IOPAMIDOL 90 ML: 755 INJECTION, SOLUTION INTRAVENOUS at 00:43

## 2021-04-12 RX ADMIN — SODIUM CHLORIDE 70 ML: 9 INJECTION, SOLUTION INTRAVENOUS at 00:44

## 2021-04-12 NOTE — ED PROVIDER NOTES
"  History     Chief Complaint   Patient presents with     Chest Pain     HPI  Gretchen Dodd is a 35 year old female who who presents for evaluation of sudden onset of tightness in her left chest when she was making dinner this evening, about 4 hours prior to arrival.  She states that it felt \"like a squeeze or a twinge.  \"She reports that the discomfort comes and goes.  Rates it 4-5 on a scale of 10 and stabbing in nature when she does feel it.  There is a constant ache in the area in between the episodes.  States that it starts in the left upper chest and radiates across the front of her chest and then radiates between her scapulas.  Worse with a deep breath.  Feels like a pressure.  She states that 3 different times over the past 1 week she has had her smart watch alarm stating that she was potentially in atrial fibrillation.  She has not checked her pulse any of these times.  She was actually asymptomatic when her watch was alarming.  On further questioning she does state that she has had bilateral lower extremity edema for the past 1 year.  Apparently had an evaluation for this.  She did receive her second [place] Covid vaccine 2 days ago and has had myalgias, fever to 102, and generalized fatigue ever since her Covid vaccine.  She denies any cough or dyspnea.  She has been taking Tylenol and ibuprofen for her symptoms.  Denies prior history of DVT or PE.  No recent trauma or injury to her chest.  Denies any reflux symptoms or other GI symptomatology.        Allergies:  Allergies   Allergen Reactions     Amoxicillin Hives and Swelling     Clindamycin Hives, Swelling, Anaphylaxis and Rash     Tape [Adhesive Tape]        Problem List:    There are no active problems to display for this patient.       Past Medical History:    Past Medical History:   Diagnosis Date     Conjunctival cyst of left eye      Connective tissue disease (H)      Headache(784.0)      Scleritis      Unequal pupil diameter        Past " Surgical History:    Past Surgical History:   Procedure Laterality Date     DENTAL SURGERY      wisdom teeth extr     TONSILLECTOMY & ADENOIDECTOMY  02/16/2011       Family History:    Family History   Problem Relation Age of Onset     Diabetes Maternal Grandfather      Dementia Maternal Grandfather      Thyroid Disease Maternal Grandmother         hyperthyroid     Chronic Obstructive Pulmonary Disease Maternal Grandmother         smoker     Gallbladder Disease Maternal Grandmother      Aneurysm Paternal Grandmother      Aneurysm Paternal Grandfather      Thyroid Disease Maternal Aunt         hypothyroid       Social History:  Marital Status:   [2]  Social History     Tobacco Use     Smoking status: Never Smoker     Smokeless tobacco: Never Used   Substance Use Topics     Alcohol use: Not Currently     Drug use: Never        Medications:    acetaZOLAMIDE (DIAMOX) 250 MG tablet  EPINEPHrine (EPIPEN) 0.3 MG/0.3ML injection  furosemide (LASIX) 20 MG tablet  phentermine (ADIPEX-P) 37.5 MG tablet  verapamil ER (CALAN-SR) 120 MG CR tablet          Review of Systems   All other systems reviewed and are negative.      Physical Exam   BP: (!) 166/99  Pulse: 92  Temp: 99.1  F (37.3  C)  Resp: 18  Weight: 131.1 kg (289 lb)  SpO2: 99 %      Physical Exam  Vitals signs and nursing note reviewed.   Constitutional:       General: She is not in acute distress.     Appearance: She is not diaphoretic.   HENT:      Head: Normocephalic and atraumatic.      Right Ear: External ear normal.      Left Ear: External ear normal.      Nose: Nose normal.      Mouth/Throat:      Pharynx: No oropharyngeal exudate.   Eyes:      General: No scleral icterus.        Right eye: No discharge.         Left eye: No discharge.      Conjunctiva/sclera: Conjunctivae normal.      Pupils: Pupils are equal, round, and reactive to light.   Neck:      Musculoskeletal: Normal range of motion and neck supple.      Thyroid: No thyromegaly.   Cardiovascular:       Rate and Rhythm: Normal rate and regular rhythm.      Heart sounds: Normal heart sounds. No murmur.   Pulmonary:      Effort: Pulmonary effort is normal. No respiratory distress.      Breath sounds: Normal breath sounds. No decreased breath sounds, wheezing, rhonchi or rales.   Chest:      Chest wall: No mass, deformity, tenderness, crepitus or edema. There is no dullness to percussion.   Abdominal:      General: Bowel sounds are normal. There is no distension.      Palpations: Abdomen is soft. There is no mass.      Tenderness: There is no abdominal tenderness. There is no guarding or rebound.   Musculoskeletal: Normal range of motion.         General: No tenderness or deformity.      Right lower leg: She exhibits no tenderness. No edema.      Left lower leg: She exhibits no tenderness. No edema.      Comments: Negative Dilia's sign   Lymphadenopathy:      Cervical: No cervical adenopathy.   Skin:     General: Skin is warm and dry.      Capillary Refill: Capillary refill takes less than 2 seconds.      Findings: No erythema or rash.   Neurological:      Mental Status: She is alert and oriented to person, place, and time.      Cranial Nerves: No cranial nerve deficit.   Psychiatric:         Behavior: Behavior normal.         Thought Content: Thought content normal.         ED Course        Procedures               EKG Interpretation:      Interpreted by Chava Parmar PA-C  Time reviewed: 2200  Symptoms at time of EKG: chest discomfort   Rhythm: normal sinus   Rate: normal  Axis: normal  Ectopy: none  Conduction: normal  ST Segments/ T Waves: No ST-T wave changes  Q Waves: none  Comparison to prior: No old EKG available    Clinical Impression: normal EKG          Critical Care time:  none               Results for orders placed or performed during the hospital encounter of 04/11/21 (from the past 24 hour(s))   CBC with platelets differential   Result Value Ref Range    WBC 7.4 4.0 - 11.0 10e9/L    RBC  Count 4.32 3.8 - 5.2 10e12/L    Hemoglobin 11.7 11.7 - 15.7 g/dL    Hematocrit 35.4 35.0 - 47.0 %    MCV 82 78 - 100 fl    MCH 27.1 26.5 - 33.0 pg    MCHC 33.1 31.5 - 36.5 g/dL    RDW 14.5 10.0 - 15.0 %    Platelet Count 227 150 - 450 10e9/L    Diff Method Automated Method     % Neutrophils 55.6 %    % Lymphocytes 29.3 %    % Monocytes 12.0 %    % Eosinophils 2.6 %    % Basophils 0.4 %    % Immature Granulocytes 0.1 %    Nucleated RBCs 0 0 /100    Absolute Neutrophil 4.1 1.6 - 8.3 10e9/L    Absolute Lymphocytes 2.2 0.8 - 5.3 10e9/L    Absolute Monocytes 0.9 0.0 - 1.3 10e9/L    Absolute Eosinophils 0.2 0.0 - 0.7 10e9/L    Absolute Basophils 0.0 0.0 - 0.2 10e9/L    Abs Immature Granulocytes 0.0 0 - 0.4 10e9/L    Absolute Nucleated RBC 0.0    D dimer quantitative   Result Value Ref Range    D Dimer 1.2 (H) 0.0 - 0.50 ug/ml FEU   Comprehensive metabolic panel   Result Value Ref Range    Sodium 140 133 - 144 mmol/L    Potassium 3.6 3.4 - 5.3 mmol/L    Chloride 109 94 - 109 mmol/L    Carbon Dioxide 24 20 - 32 mmol/L    Anion Gap 7 3 - 14 mmol/L    Glucose 89 70 - 99 mg/dL    Urea Nitrogen 22 7 - 30 mg/dL    Creatinine 0.83 0.52 - 1.04 mg/dL    GFR Estimate >90 >60 mL/min/[1.73_m2]    GFR Estimate If Black >90 >60 mL/min/[1.73_m2]    Calcium 9.0 8.5 - 10.1 mg/dL    Bilirubin Total 0.3 0.2 - 1.3 mg/dL    Albumin 3.3 (L) 3.4 - 5.0 g/dL    Protein Total 7.3 6.8 - 8.8 g/dL    Alkaline Phosphatase 68 40 - 150 U/L    ALT 33 0 - 50 U/L    AST 18 0 - 45 U/L   Lipase   Result Value Ref Range    Lipase 244 73 - 393 U/L   Troponin I   Result Value Ref Range    Troponin I ES <0.015 0.000 - 0.045 ug/L   HCG qualitative Blood   Result Value Ref Range    HCG Qualitative Serum Negative NEG^Negative   CT Chest Pulmonary Embolism w Contrast    Narrative    EXAM: CT CHEST PULMONARY EMBOLISM W CONTRAST  LOCATION: [facility]  DATE/TIME: 4/12/2021 12:36 AM    INDICATION: Chest pain. Elevated D-dimer.  COMPARISON: None.  TECHNIQUE:  CT chest pulmonary angiogram during arterial phase injection of IV contrast. Multiplanar reformats and MIP reconstructions were performed. Dose reduction techniques were used.   CONTRAST: 80 mL Isovue 370.    FINDINGS:  ANGIOGRAM CHEST: Pulmonary arteries are normal caliber and negative for pulmonary emboli. Thoracic aorta is negative for dissection. The heart size is normal.    LUNGS AND PLEURA: Normal.    MEDIASTINUM/AXILLAE: Normal.    CORONARY ARTERY CALCIFICATION: None.    UPPER ABDOMEN: Normal.    MUSCULOSKELETAL: Normal.      Impression    IMPRESSION:  1.  There is no pulmonary embolus, aortic aneurysm or dissection. No acute abnormality.       Medications   iopamidol (ISOVUE-370) solution 100 mL (90 mLs Intravenous Given 4/12/21 0043)   Sodium Chloride 0.9 % bag 100mL for CT scan (70 mLs Intravenous Given 4/12/21 0044)   sodium chloride (PF) 0.9% PF flush 3 mL (10 mLs Intracatheter Given 4/12/21 0042)       Assessments & Plan (with Medical Decision Making)     Atypical chest pain  Palpitations     35 year old female presents for evaluation of onset of chest discomfort about 4 hours prior to arrival when she was making dinner.  Describes as a left sided chest pain radiating across her chest and in between her scapulas 4-5 on a scale of 10 associated with increased symptoms during deep breathing.  See HPI for details.  Has been experiencing myalgias, generalized fatigue, and fever up to 102 for the past 2 days ever since receiving her second dose of Covid vaccine.  On exam blood pressure 119/65, temperature 99.1, pulse 90, respiration 20, oxygen saturation 97% on room air.  Generally healthy-appearing female no acute distress.  Exam is completely normal as noted above.  EKG performed without acute ST or T wave changes.  IV was established.  Laboratory levels display undetectable troponin, normal lipase, normal CBC, and normal comprehensive metabolic panel.  D-dimer was elevated at 1.2.  Proceeded with CT PE  study which was completely normal.  On further questioning, the patient admits to extreme stress levels recently with job stressors and the fact that she is providing care for grandparents and receiving limited help from her siblings.  She admits that this may be a component to overall symptoms.  She had onset of chest discomfort 4 hours prior to ED presentation, therefore I think a single troponin level for a person of her age and lack of risk factors is appropriate.  This is not consistent with coronary artery disease.  She certainly could have a periodic arrhythmia given that her smart watch is firing and alerting her to potential irregular or tachycardic rhythm.  Therefore, I think further work-up with a Zio patch would be indicated and I placed a referral for this.  She is changing clinics right now, and therefore will find a new PCP.  She is fully aware that she needs to see a primary care provider for follow-up about 7-9 days after she turns in the heart monitor so that she can get the results.  Strict ED return instructions reviewed with the patient.  Return if her symptoms worsen or change in any way.  She was in agreement with this plan and was suitable for discharge.     I have reviewed the nursing notes.    I have reviewed the findings, diagnosis, plan and need for follow up with the patient.       Discharge Medication List as of 4/12/2021  1:44 AM          Final diagnoses:   Atypical chest pain   Palpitations     Disclaimer: This note consists of symbols derived from keyboarding, dictation and/or voice recognition software. As a result, there may be errors in the script that have gone undetected. Please consider this when interpreting information found in this chart.      4/11/2021   [facility] EMERGENCY DEPT     Chava Parmar PA-C  04/12/21 0155

## 2021-04-12 NOTE — ED NOTES
Pt reports having her second covid shot on Friday, reports having a headache, nausea and a fever since receiving the injection. Pt states she has been alternating tylenol and ibuprofen.

## 2021-04-12 NOTE — DISCHARGE INSTRUCTIONS
It was a pleasure working with you today!  I hope your condition improves rapidly!     Thankfully, we did not find anything concerning with your evaluation today.  I would like for you to get a heart monitor set up in cardiology to make sure we fully evaluate your palpitations and irregular heartbeat that your smart watch is catching.  Please call the cardiology number tomorrow to get set up with this.  Please then schedule a primary care appointment about 7-9 days after you are scheduled to return the heart monitor to go over the results in detail.  If your symptoms greatly worsen prior to then, present to the ED for recheck.  As we discussed, please try and work on the stressors that have been bothering you to see if this potentially would make a difference.

## 2021-04-12 NOTE — ED TRIAGE NOTES
Presents to ED with concerns of chest tightness and palpitations. States off and on for the past week her apple watch has been alarming for Afib. No history. Pulse regular during triage. Reports fever as well post COVID shot on Friday.

## 2021-04-24 ENCOUNTER — HEALTH MAINTENANCE LETTER (OUTPATIENT)
Age: 36
End: 2021-04-24

## 2021-07-20 ENCOUNTER — HOSPITAL ENCOUNTER (EMERGENCY)
Facility: CLINIC | Age: 36
Discharge: HOME | End: 2021-07-20
Attending: NURSE PRACTITIONER | Admitting: NURSE PRACTITIONER
Payer: COMMERCIAL

## 2021-07-20 VITALS
RESPIRATION RATE: 16 BRPM | BODY MASS INDEX: 41.81 KG/M2 | DIASTOLIC BLOOD PRESSURE: 103 MMHG | OXYGEN SATURATION: 99 % | SYSTOLIC BLOOD PRESSURE: 148 MMHG | TEMPERATURE: 97.7 F | WEIGHT: 275 LBS | HEART RATE: 80 BPM

## 2021-07-20 DIAGNOSIS — R51.9 INTRACTABLE HEADACHE, UNSPECIFIED CHRONICITY PATTERN, UNSPECIFIED HEADACHE TYPE: ICD-10-CM

## 2021-07-20 PROCEDURE — 96365 THER/PROPH/DIAG IV INF INIT: CPT | Performed by: NURSE PRACTITIONER

## 2021-07-20 PROCEDURE — 250N000011 HC RX IP 250 OP 636: Performed by: NURSE PRACTITIONER

## 2021-07-20 PROCEDURE — 99284 EMERGENCY DEPT VISIT MOD MDM: CPT | Mod: 25 | Performed by: NURSE PRACTITIONER

## 2021-07-20 PROCEDURE — 96375 TX/PRO/DX INJ NEW DRUG ADDON: CPT | Performed by: NURSE PRACTITIONER

## 2021-07-20 PROCEDURE — 99283 EMERGENCY DEPT VISIT LOW MDM: CPT | Performed by: NURSE PRACTITIONER

## 2021-07-20 RX ORDER — ASPIRIN 81 MG/1
81 TABLET ORAL DAILY
COMMUNITY
End: 2023-02-25

## 2021-07-20 RX ORDER — DIPHENHYDRAMINE HYDROCHLORIDE 50 MG/ML
25 INJECTION INTRAMUSCULAR; INTRAVENOUS ONCE
Status: COMPLETED | OUTPATIENT
Start: 2021-07-20 | End: 2021-07-20

## 2021-07-20 RX ORDER — TOPIRAMATE 25 MG/1
25 TABLET, FILM COATED ORAL 2 TIMES DAILY
COMMUNITY
Start: 2021-07-02 | End: 2023-07-19

## 2021-07-20 RX ORDER — DEXAMETHASONE SODIUM PHOSPHATE 10 MG/ML
10 INJECTION, SOLUTION INTRAMUSCULAR; INTRAVENOUS ONCE
Status: COMPLETED | OUTPATIENT
Start: 2021-07-20 | End: 2021-07-20

## 2021-07-20 RX ORDER — KETOROLAC TROMETHAMINE 15 MG/ML
15 INJECTION, SOLUTION INTRAMUSCULAR; INTRAVENOUS ONCE
Status: DISCONTINUED | OUTPATIENT
Start: 2021-07-20 | End: 2021-07-20

## 2021-07-20 RX ORDER — MAGNESIUM SULFATE 1 G/100ML
1 INJECTION INTRAVENOUS ONCE
Status: COMPLETED | OUTPATIENT
Start: 2021-07-20 | End: 2021-07-20

## 2021-07-20 RX ORDER — KETOROLAC TROMETHAMINE 15 MG/ML
15 INJECTION, SOLUTION INTRAMUSCULAR; INTRAVENOUS ONCE
Status: COMPLETED | OUTPATIENT
Start: 2021-07-20 | End: 2021-07-20

## 2021-07-20 RX ADMIN — PROCHLORPERAZINE EDISYLATE 10 MG: 5 INJECTION INTRAMUSCULAR; INTRAVENOUS at 18:46

## 2021-07-20 RX ADMIN — DEXAMETHASONE SODIUM PHOSPHATE 10 MG: 10 INJECTION, SOLUTION INTRAMUSCULAR; INTRAVENOUS at 18:50

## 2021-07-20 RX ADMIN — KETOROLAC TROMETHAMINE 15 MG: 15 INJECTION, SOLUTION INTRAMUSCULAR; INTRAVENOUS at 18:45

## 2021-07-20 RX ADMIN — MAGNESIUM SULFATE HEPTAHYDRATE 1 G: 1 INJECTION, SOLUTION INTRAVENOUS at 18:53

## 2021-07-20 RX ADMIN — DIPHENHYDRAMINE HYDROCHLORIDE 25 MG: 50 INJECTION INTRAMUSCULAR; INTRAVENOUS at 18:43

## 2021-07-20 NOTE — ED PROVIDER NOTES
History     Chief Complaint   Patient presents with     Headache     HPI  Gretchen Dodd is a 35 year old female who presents to the emergency department for evaluation of headache.  Patient reports persistent headache for the last 2 weeks.  She has a history of chronic headaches.  This feels similar to her typical headaches with the exception that it has been persistent.  Headache is accompanied by nausea, but no vomiting.  Denies fever or chills.  She does follow with a neurologist and has been started on Topamax.  She states the Topamax also for weight loss.  She trying to get pregnant and having worked up for fertility concerns.  She was negative for PCOS but did show insulin resistance. Luous anticoagulant and anti-cardiolipin antibodies were abnormal. She has been referred to hematology.    Allergies:  Allergies   Allergen Reactions     Amoxicillin Hives and Swelling     Clindamycin Hives, Swelling, Anaphylaxis and Rash     Tape [Adhesive Tape]        Problem List:    There are no problems to display for this patient.       Past Medical History:    Past Medical History:   Diagnosis Date     Conjunctival cyst of left eye      Connective tissue disease (H)      Headache(784.0)      Scleritis      Unequal pupil diameter        Past Surgical History:    Past Surgical History:   Procedure Laterality Date     DENTAL SURGERY      wisdom teeth extr     TONSILLECTOMY & ADENOIDECTOMY  02/16/2011       Family History:    Family History   Problem Relation Age of Onset     Diabetes Maternal Grandfather      Dementia Maternal Grandfather      Thyroid Disease Maternal Grandmother         hyperthyroid     Chronic Obstructive Pulmonary Disease Maternal Grandmother         smoker     Gallbladder Disease Maternal Grandmother      Aneurysm Paternal Grandmother      Aneurysm Paternal Grandfather      Thyroid Disease Maternal Aunt         hypothyroid       Social History:  Marital Status:   [2]  Social History     Tobacco  Use     Smoking status: Never Smoker     Smokeless tobacco: Never Used   Substance Use Topics     Alcohol use: Not Currently     Drug use: Never        Medications:    aspirin 81 MG EC tablet  topiramate (TOPAMAX) 25 MG tablet  EPINEPHrine (EPIPEN) 0.3 MG/0.3ML injection          Review of Systems  As mentioned above in the history present illness. All other systems were reviewed and are negative.    Physical Exam   BP: (!) 148/103  Pulse: 80  Temp: 97.7  F (36.5  C)  Resp: 16  Weight: 124.7 kg (275 lb)  SpO2: 99 %      Physical Exam  Constitutional:       General: She is in acute distress.      Appearance: Normal appearance. She is not ill-appearing, toxic-appearing or diaphoretic.   HENT:      Head: Normocephalic and atraumatic.      Right Ear: Tympanic membrane and ear canal normal.      Left Ear: Tympanic membrane and ear canal normal.      Nose: Nose normal.      Mouth/Throat:      Mouth: Mucous membranes are moist.      Pharynx: No posterior oropharyngeal erythema.   Eyes:      General: No scleral icterus.     Extraocular Movements: Extraocular movements intact.      Conjunctiva/sclera: Conjunctivae normal.   Cardiovascular:      Rate and Rhythm: Normal rate and regular rhythm.      Heart sounds: Normal heart sounds. No murmur heard.     Pulmonary:      Effort: Pulmonary effort is normal. No respiratory distress.      Breath sounds: Normal breath sounds.   Abdominal:      General: Bowel sounds are normal.      Palpations: Abdomen is soft.      Tenderness: There is no abdominal tenderness.   Musculoskeletal:         General: Normal range of motion.   Skin:     General: Skin is warm.      Findings: No rash.   Neurological:      General: No focal deficit present.      Mental Status: She is alert and oriented to person, place, and time.         ED Course        Procedures       No results found for this or any previous visit (from the past 24 hour(s)).    Medications   0.9% sodium chloride BOLUS (has no  administration in time range)   dexamethasone PF (DECADRON) injection 10 mg (10 mg Intravenous Given 7/20/21 1850)   prochlorperazine (COMPAZINE) injection 10 mg (10 mg Intravenous Given 7/20/21 1846)   magnesium sulfate 1 gm in 100mL D5W intermittent infusion (0 g Intravenous Stopped 7/20/21 2004)   diphenhydrAMINE (BENADRYL) injection 25 mg (25 mg Intravenous Given 7/20/21 1843)   ketorolac (TORADOL) injection 15 mg (15 mg Intravenous Given 7/20/21 1845)     1940: at bedside.  Significant improvement in her headache which is almost gone.  Patient feels ready for discharge home.  Assessments & Plan (with Medical Decision Making)   35-year-old with intractable headache for the last 10 days.  Not responding to over-the-counter medications.  She has a history of chronic headaches.  No worrisome history or exam findings to warrant emergent imaging or labs.  Patient was given IV normal saline 1 L bolus, IV Toradol, IV Benadryl, IV Compazine, IV magnesium, and IV Decadron.  She had significant improvement in her headache which is almost gone.  Patient discharged home stable.    I have reviewed the nursing notes.    I have reviewed the findings, diagnosis, plan and need for follow up with the patient.      New Prescriptions    No medications on file       Final diagnoses:   Intractable headache, unspecified chronicity pattern, unspecified headache type       7/20/2021   [facility] EMERGENCY DEPT     Blessing Emmanuel APRN CNP  07/21/21 0149

## 2021-07-20 NOTE — ED NOTES
Pt reports trying multiple OTC medications to help with her headache without improvement. The last thing she took was Tylenol 1000mg today at 1400. Last dose of Ibuprofen was a couple days ago. Pt reports pain is similar to past headaches other then the length of time she's had it.

## 2021-07-20 NOTE — ED TRIAGE NOTES
Patient presents with concerns for headache for the past 2 weeks. She reports chronic headaches and has a neurologist who recently started her on topamax. She reports she is trying to get pregnant. Kathryn Dos Santos RN

## 2021-10-03 ENCOUNTER — HEALTH MAINTENANCE LETTER (OUTPATIENT)
Age: 36
End: 2021-10-03

## 2022-05-15 ENCOUNTER — HEALTH MAINTENANCE LETTER (OUTPATIENT)
Age: 37
End: 2022-05-15

## 2022-09-10 ENCOUNTER — HEALTH MAINTENANCE LETTER (OUTPATIENT)
Age: 37
End: 2022-09-10

## 2023-02-25 ENCOUNTER — APPOINTMENT (OUTPATIENT)
Dept: CT IMAGING | Facility: CLINIC | Age: 38
End: 2023-02-25
Attending: EMERGENCY MEDICINE
Payer: COMMERCIAL

## 2023-02-25 ENCOUNTER — HOSPITAL ENCOUNTER (EMERGENCY)
Facility: CLINIC | Age: 38
Discharge: HOME | End: 2023-02-25
Attending: EMERGENCY MEDICINE | Admitting: EMERGENCY MEDICINE
Payer: COMMERCIAL

## 2023-02-25 VITALS
SYSTOLIC BLOOD PRESSURE: 152 MMHG | OXYGEN SATURATION: 100 % | DIASTOLIC BLOOD PRESSURE: 85 MMHG | HEART RATE: 79 BPM | RESPIRATION RATE: 18 BRPM | TEMPERATURE: 97.8 F

## 2023-02-25 DIAGNOSIS — K60.2 ANAL FISSURE: ICD-10-CM

## 2023-02-25 DIAGNOSIS — K62.5 RECTAL BLEEDING: ICD-10-CM

## 2023-02-25 LAB
ABO/RH(D): NORMAL
ALBUMIN SERPL BCG-MCNC: 3.9 G/DL (ref 3.5–5.2)
ALP SERPL-CCNC: 77 U/L (ref 35–104)
ALT SERPL W P-5'-P-CCNC: 20 U/L (ref 10–35)
ANION GAP SERPL CALCULATED.3IONS-SCNC: 10 MMOL/L (ref 7–15)
ANTIBODY SCREEN: NEGATIVE
AST SERPL W P-5'-P-CCNC: 18 U/L (ref 10–35)
BASOPHILS # BLD AUTO: 0.1 10E3/UL (ref 0–0.2)
BASOPHILS NFR BLD AUTO: 1 %
BILIRUB SERPL-MCNC: <0.2 MG/DL
BUN SERPL-MCNC: 20.1 MG/DL (ref 6–20)
CALCIUM SERPL-MCNC: 9.4 MG/DL (ref 8.6–10)
CHLORIDE SERPL-SCNC: 101 MMOL/L (ref 98–107)
CREAT SERPL-MCNC: 0.79 MG/DL (ref 0.51–0.95)
DEPRECATED HCO3 PLAS-SCNC: 25 MMOL/L (ref 22–29)
EOSINOPHIL # BLD AUTO: 0.4 10E3/UL (ref 0–0.7)
EOSINOPHIL NFR BLD AUTO: 3 %
ERYTHROCYTE [DISTWIDTH] IN BLOOD BY AUTOMATED COUNT: 14.7 % (ref 10–15)
GFR SERPL CREATININE-BSD FRML MDRD: >90 ML/MIN/1.73M2
GLUCOSE SERPL-MCNC: 97 MG/DL (ref 70–99)
HCT VFR BLD AUTO: 37 % (ref 35–47)
HEMOCCULT STL QL: POSITIVE
HGB BLD-MCNC: 11.8 G/DL (ref 11.7–15.7)
IMM GRANULOCYTES # BLD: 0 10E3/UL
IMM GRANULOCYTES NFR BLD: 0 %
INR PPP: 1.02 (ref 0.85–1.15)
LYMPHOCYTES # BLD AUTO: 3.5 10E3/UL (ref 0.8–5.3)
LYMPHOCYTES NFR BLD AUTO: 32 %
MCH RBC QN AUTO: 26.6 PG (ref 26.5–33)
MCHC RBC AUTO-ENTMCNC: 31.9 G/DL (ref 31.5–36.5)
MCV RBC AUTO: 83 FL (ref 78–100)
MONOCYTES # BLD AUTO: 0.7 10E3/UL (ref 0–1.3)
MONOCYTES NFR BLD AUTO: 7 %
NEUTROPHILS # BLD AUTO: 6.2 10E3/UL (ref 1.6–8.3)
NEUTROPHILS NFR BLD AUTO: 57 %
NRBC # BLD AUTO: 0 10E3/UL
NRBC BLD AUTO-RTO: 0 /100
PLATELET # BLD AUTO: 302 10E3/UL (ref 150–450)
POTASSIUM SERPL-SCNC: 3.9 MMOL/L (ref 3.4–5.3)
PROT SERPL-MCNC: 7 G/DL (ref 6.4–8.3)
RBC # BLD AUTO: 4.44 10E6/UL (ref 3.8–5.2)
SODIUM SERPL-SCNC: 136 MMOL/L (ref 136–145)
SPECIMEN EXPIRATION DATE: NORMAL
WBC # BLD AUTO: 10.8 10E3/UL (ref 4–11)

## 2023-02-25 PROCEDURE — 250N000009 HC RX 250: Performed by: EMERGENCY MEDICINE

## 2023-02-25 PROCEDURE — 82272 OCCULT BLD FECES 1-3 TESTS: CPT | Performed by: EMERGENCY MEDICINE

## 2023-02-25 PROCEDURE — 80053 COMPREHEN METABOLIC PANEL: CPT | Performed by: EMERGENCY MEDICINE

## 2023-02-25 PROCEDURE — 250N000011 HC RX IP 250 OP 636: Performed by: EMERGENCY MEDICINE

## 2023-02-25 PROCEDURE — 86901 BLOOD TYPING SEROLOGIC RH(D): CPT | Performed by: EMERGENCY MEDICINE

## 2023-02-25 PROCEDURE — 99284 EMERGENCY DEPT VISIT MOD MDM: CPT | Performed by: EMERGENCY MEDICINE

## 2023-02-25 PROCEDURE — 85610 PROTHROMBIN TIME: CPT | Performed by: EMERGENCY MEDICINE

## 2023-02-25 PROCEDURE — 99285 EMERGENCY DEPT VISIT HI MDM: CPT | Mod: 25 | Performed by: EMERGENCY MEDICINE

## 2023-02-25 PROCEDURE — 86850 RBC ANTIBODY SCREEN: CPT | Performed by: EMERGENCY MEDICINE

## 2023-02-25 PROCEDURE — 36415 COLL VENOUS BLD VENIPUNCTURE: CPT | Performed by: EMERGENCY MEDICINE

## 2023-02-25 PROCEDURE — 74177 CT ABD & PELVIS W/CONTRAST: CPT

## 2023-02-25 PROCEDURE — 85025 COMPLETE CBC W/AUTO DIFF WBC: CPT | Performed by: EMERGENCY MEDICINE

## 2023-02-25 RX ORDER — ASPIRIN 325 MG
325 TABLET ORAL AT BEDTIME
COMMUNITY

## 2023-02-25 RX ORDER — LEVOTHYROXINE SODIUM 50 UG/1
50 TABLET ORAL DAILY
COMMUNITY
Start: 2023-01-23

## 2023-02-25 RX ORDER — METFORMIN HYDROCHLORIDE 750 MG/1
750 TABLET, EXTENDED RELEASE ORAL 2 TIMES DAILY WITH MEALS
COMMUNITY
Start: 2023-02-20

## 2023-02-25 RX ORDER — LIRAGLUTIDE 6 MG/ML
3 INJECTION, SOLUTION SUBCUTANEOUS
COMMUNITY
Start: 2023-01-20 | End: 2023-07-19

## 2023-02-25 RX ORDER — TRAZODONE HYDROCHLORIDE 100 MG/1
1 TABLET ORAL AT BEDTIME
COMMUNITY
Start: 2023-02-07 | End: 2023-07-19

## 2023-02-25 RX ORDER — TOBRAMYCIN AND DEXAMETHASONE 3; 1 MG/ML; MG/ML
SUSPENSION/ DROPS OPHTHALMIC
COMMUNITY
Start: 2023-02-17 | End: 2023-07-19

## 2023-02-25 RX ORDER — PREDNISOLONE ACETATE 10 MG/ML
SUSPENSION/ DROPS OPHTHALMIC
COMMUNITY
Start: 2022-10-14 | End: 2023-07-19

## 2023-02-25 RX ORDER — FLURBIPROFEN SODIUM 0.3 MG/ML
SOLUTION/ DROPS OPHTHALMIC
COMMUNITY
Start: 2022-12-19 | End: 2023-07-19

## 2023-02-25 RX ORDER — PROPRANOLOL HYDROCHLORIDE 120 MG/1
120 CAPSULE, EXTENDED RELEASE ORAL DAILY
COMMUNITY
Start: 2023-02-08

## 2023-02-25 RX ORDER — IOPAMIDOL 755 MG/ML
200 INJECTION, SOLUTION INTRAVASCULAR ONCE
Status: COMPLETED | OUTPATIENT
Start: 2023-02-25 | End: 2023-02-25

## 2023-02-25 RX ORDER — OMEPRAZOLE 40 MG/1
40 CAPSULE, DELAYED RELEASE ORAL AT BEDTIME
COMMUNITY
Start: 2022-08-11

## 2023-02-25 RX ORDER — MAGNESIUM OXIDE 400 MG/1
400 TABLET ORAL DAILY
COMMUNITY

## 2023-02-25 RX ADMIN — IOPAMIDOL 100 ML: 755 INJECTION, SOLUTION INTRAVENOUS at 22:13

## 2023-02-25 RX ADMIN — SODIUM CHLORIDE 60 ML: 9 INJECTION, SOLUTION INTRAVENOUS at 22:13

## 2023-02-25 ASSESSMENT — ACTIVITIES OF DAILY LIVING (ADL): ADLS_ACUITY_SCORE: 35

## 2023-02-26 NOTE — DISCHARGE INSTRUCTIONS
Your blood work all appeared normal.  Your CT scan did not show any acute worrisome findings to explain your abdominal discomfort or rectal bleeding.  There was some inflammation in the layer around the rectum, but did not see any sign of active bleeding    You do have a small anal fissure.  This could be contributing to some bright red blood and bleeding.  This may be due to the diarrhea you have been experiencing, and will hopefully heal once the diarrhea slows down and stops    You may continue all your medicines as previously prescribed, no changes were made today.  You should follow-up closely with your primary doctor and prescribing provider especially if your symptoms persist and you are unable to tolerate metformin    You may continue to use Imodium per package instructions as needed to help with loose stools and diarrhea    If you develop any new or worsening symptoms do not hesitate to return to the emergency room for evaluation

## 2023-02-26 NOTE — ED TRIAGE NOTES
Pt reports having diarrhea tues and wed. Took immodium and pt has been having 'blood clots and lots of bleeding' from rectum and in stool since Thursday morning. Pt c/o abd distention and bloating.    Pt notes 'a whole bunch of new meds started' as well beginning of the week.      Triage Assessment     Row Name 02/25/23 2041       Triage Assessment (Adult)    Airway WDL WDL       Respiratory WDL    Respiratory WDL WDL       Cardiac WDL    Cardiac WDL WDL

## 2023-02-26 NOTE — ED PROVIDER NOTES
"  History     Chief Complaint   Patient presents with     Rectal Bleeding     HPI  Gretchen Dodd is a 37 year old female who presents to the emergency room for concern of rectal bleeding.  Symptoms have been ongoing for the last few days.  She states that she recently started several new medications, is trying to conceive and changed to medication to better support her fertility.  Stop taking Topamax and start taking propranolol for chronic migraine.  She has also recently been initiated on oral metformin.  This started giving her some diarrhea and loose stools, but Tuesday noticed that she was passing bright red blood.  Initially noticed it when wiping, but then noted that stool that she was passing was dotted with clots.  Is not having any dysuria or noting any blood in her urine.  Last menstrual period was approximately 2 weeks ago.  She is not describing any abdominal pain but does say that she feels like her abdomen is somewhat distended and \"uncomfortable\".  Has not been nauseated or vomiting.  Has not been running a fever.  No previous abdominal surgeries.  Has had rectal bleeding 1 time before many years ago and was told that she had an anal fissure.  Has never needed a colonoscopy.  No known history of ulcerative colitis or Crohn's, no specific diagnosis of irritable bowel syndrome    Allergies:  Allergies   Allergen Reactions     Amoxicillin Hives and Swelling     Clindamycin Hives, Swelling, Anaphylaxis and Rash     Tape [Adhesive Tape]        Problem List:    There are no problems to display for this patient.       Past Medical History:    Past Medical History:   Diagnosis Date     Conjunctival cyst of left eye      Connective tissue disease (H)      Headache(784.0)      Scleritis      Unequal pupil diameter        Past Surgical History:    Past Surgical History:   Procedure Laterality Date     DENTAL SURGERY      wisdom teeth extr     TONSILLECTOMY & ADENOIDECTOMY  02/16/2011       Family History:  "   Family History   Problem Relation Age of Onset     Diabetes Maternal Grandfather      Dementia Maternal Grandfather      Thyroid Disease Maternal Grandmother         hyperthyroid     Chronic Obstructive Pulmonary Disease Maternal Grandmother         smoker     Gallbladder Disease Maternal Grandmother      Aneurysm Paternal Grandmother      Aneurysm Paternal Grandfather      Thyroid Disease Maternal Aunt         hypothyroid       Social History:  Marital Status:   [2]  Social History     Tobacco Use     Smoking status: Never     Smokeless tobacco: Never   Substance Use Topics     Alcohol use: Not Currently     Drug use: Never        Medications:    aspirin (ASA) 325 MG tablet  levothyroxine (SYNTHROID/LEVOTHROID) 50 MCG tablet  liraglutide - Weight Management (SAXENDA) 18 MG/3ML pen  magnesium oxide (MAG-OX) 400 MG tablet  metFORMIN (GLUCOPHAGE-XR) 750 MG 24 hr tablet  omeprazole (PRILOSEC) 40 MG DR capsule  prednisoLONE acetate (PRED FORTE) 1 % ophthalmic suspension  propranolol ER (INDERAL LA) 120 MG 24 hr capsule  tobramycin-dexamethasone (TOBRADEX) 0.3-0.1 % ophthalmic suspension  topiramate (TOPAMAX) 25 MG tablet  traZODone (DESYREL) 100 MG tablet  ULTIGUARD SAFEPACK PEN NEEDLE 32G X 4 MM miscellaneous  EPINEPHrine (ANY BX GENERIC EQUIV) 0.3 MG/0.3ML injection 2-pack          Review of Systems   All other systems reviewed and are negative.      Physical Exam   BP: (!) 152/85  Pulse: 79  Temp: 97.8  F (36.6  C)  Resp: 18  SpO2: 100 %      Physical Exam  Vitals and nursing note reviewed.   Constitutional:       General: She is not in acute distress.     Appearance: She is obese. She is not diaphoretic.   HENT:      Head: Atraumatic.      Mouth/Throat:      Pharynx: No oropharyngeal exudate.   Eyes:      General: No scleral icterus.     Pupils: Pupils are equal, round, and reactive to light.   Cardiovascular:      Heart sounds: Normal heart sounds.   Pulmonary:      Effort: Pulmonary effort is normal. No  respiratory distress.      Breath sounds: Normal breath sounds.   Abdominal:      General: Bowel sounds are normal.      Palpations: Abdomen is soft.      Tenderness: There is no abdominal tenderness. There is no guarding or rebound.   Genitourinary:     Rectum: Guaiac result positive. Anal fissure present. No external hemorrhoid. Normal anal tone.      Comments: Anal fissure at 6 o'clock position, not actively bleeding  Musculoskeletal:         General: No tenderness.   Skin:     General: Skin is warm.      Findings: No rash.   Neurological:      Mental Status: She is alert.         ED Course                 Procedures              Critical Care time:  none               Results for orders placed or performed during the hospital encounter of 02/25/23 (from the past 24 hour(s))   CBC with platelets differential    Narrative    The following orders were created for panel order CBC with platelets differential.  Procedure                               Abnormality         Status                     ---------                               -----------         ------                     CBC with platelets and d...[[id number]]                      Final result                 Please view results for these tests on the individual orders.   ABO/Rh type and screen    Narrative    The following orders were created for panel order ABO/Rh type and screen.  Procedure                               Abnormality         Status                     ---------                               -----------         ------                     Adult Type and Screen[[id number]]                            Edited Result - FINAL        Please view results for these tests on the individual orders.   INR   Result Value Ref Range    INR 1.02 0.85 - 1.15   Comprehensive metabolic panel   Result Value Ref Range    Sodium 136 136 - 145 mmol/L    Potassium 3.9 3.4 - 5.3 mmol/L    Chloride 101 98 - 107 mmol/L    Carbon Dioxide (CO2) 25 22 - 29 mmol/L    Anion  Gap 10 7 - 15 mmol/L    Urea Nitrogen 20.1 (H) 6.0 - 20.0 mg/dL    Creatinine 0.79 0.51 - 0.95 mg/dL    Calcium 9.4 8.6 - 10.0 mg/dL    Glucose 97 70 - 99 mg/dL    Alkaline Phosphatase 77 35 - 104 U/L    AST 18 10 - 35 U/L    ALT 20 10 - 35 U/L    Protein Total 7.0 6.4 - 8.3 g/dL    Albumin 3.9 3.5 - 5.2 g/dL    Bilirubin Total <0.2 <=1.2 mg/dL    GFR Estimate >90 >60 mL/min/1.73m2   CBC with platelets and differential   Result Value Ref Range    WBC Count 10.8 4.0 - 11.0 10e3/uL    RBC Count 4.44 3.80 - 5.20 10e6/uL    Hemoglobin 11.8 11.7 - 15.7 g/dL    Hematocrit 37.0 35.0 - 47.0 %    MCV 83 78 - 100 fL    MCH 26.6 26.5 - 33.0 pg    MCHC 31.9 31.5 - 36.5 g/dL    RDW 14.7 10.0 - 15.0 %    Platelet Count 302 150 - 450 10e3/uL    % Neutrophils 57 %    % Lymphocytes 32 %    % Monocytes 7 %    % Eosinophils 3 %    % Basophils 1 %    % Immature Granulocytes 0 %    NRBCs per 100 WBC 0 <1 /100    Absolute Neutrophils 6.2 1.6 - 8.3 10e3/uL    Absolute Lymphocytes 3.5 0.8 - 5.3 10e3/uL    Absolute Monocytes 0.7 0.0 - 1.3 10e3/uL    Absolute Eosinophils 0.4 0.0 - 0.7 10e3/uL    Absolute Basophils 0.1 0.0 - 0.2 10e3/uL    Absolute Immature Granulocytes 0.0 <=0.4 10e3/uL    Absolute NRBCs 0.0 10e3/uL   Adult Type and Screen   Result Value Ref Range    ABO/RH(D) B POS     Antibody Screen Negative Negative    SPECIMEN EXPIRATION DATE [id number]    Occult blood stool   Result Value Ref Range    Occult Blood Positive (A) Negative   CT Abdomen Pelvis w Contrast    Narrative    EXAM: CT ABDOMEN PELVIS W CONTRAST  LOCATION: [facility]  DATE/TIME: 2/25/2023 10:22 PM    INDICATION: Abdominal bloating, rectal bleeding.  COMPARISON: None.  TECHNIQUE: CT scan of the abdomen and pelvis was performed following injection of IV contrast. Multiplanar reformats were obtained. Dose reduction techniques were used.  CONTRAST: 100 mL Isovue 370.    FINDINGS:   LOWER CHEST: Normal.    HEPATOBILIARY: Fatty  liver.    PANCREAS: Normal.    SPLEEN: Normal.    ADRENAL GLANDS: Normal.    KIDNEYS/BLADDER: Normal.    BOWEL: Prominent submucosal fat in the rectum.    LYMPH NODES: Normal.    VASCULATURE: Unremarkable.    PELVIC ORGANS: 3.7 cm cyst within the right ovary.    MUSCULOSKELETAL: Bilateral spondylolysis at the lumbosacral interspace with minimal anterior subluxation L5 on S1.      Impression    IMPRESSION:   1.  3.7 cm cyst within the right ovary. No free fluid in the pelvis.    2.  Prominence of the submucosal fat in the rectum compatible with previous inflammation. No evidence for active proctitis.    3.  Bilateral spondylolysis lumbosacral interspace with minimal anterior subluxation L5 on S1.       Medications   iopamidol (ISOVUE-370) solution 200 mL (100 mLs Intravenous $Given 2/25/23 2213)   sodium chloride 0.9 % bag 100mL for CT scan flush use (60 mLs Intravenous $Given 2/25/23 2213)       Assessments & Plan (with Medical Decision Making)  Gretchen is a 37-year-old female presenting to the emergency room for concerns of diarrhea, rectal bleeding, and abdominal discomfort.  See history and focused physical exam as above  Pleasant, obese 37-year-old female in no acute distress, is mildly hypertensive but otherwise vitally stable, not tachycardic, otherwise well-appearing.  She is not pale, no bruising appreciated.  She states abdomen feels distended, but it is not rigid, no masses appreciated.  We will check some lab work, will get occult stool testing, will need to do a rectal exam, and will likely get a CT to evaluate for any abdominal or pelvic pathology for her symptoms.  She is agreeable to this plan  Labs and imaging as above.  No acute emergent findings.  She does have some submucosal fat inflammatory change around the rectum, but otherwise no acute process appreciated.  Hemoglobin is within acceptable limits.  Hemoccult testing was positive as suspected.  Since patient is otherwise stable, do not see need  for emergent intervention or hospitalization at this time.  Rectal exam did reveal an anal fissure which may be contributing to some of her symptoms.  Is likely having bloody stool secondary to colorectal irritation.  Recommend she follow-up closely with her primary provider and may continue to use Imodium.  Return anytime if symptoms worsen.  She expresses understanding and is discharged in stable condition     I have reviewed the nursing notes.    I have reviewed the findings, diagnosis, plan and need for follow up with the patient.           Medical Decision Making  The patient's presentation was of low complexity (an acute and uncomplicated illness or injury).    The patient's evaluation involved:  ordering and/or review of 3+ test(s) in this encounter (see separate area of note for details)    The patient's management necessitated moderate risk (prescription drug management including medications given in the ED).        New Prescriptions    No medications on file       Final diagnoses:   Rectal bleeding   Anal fissure       2/25/2023   [facility] EMERGENCY DEPT     aSrah Mayberry DO  02/25/23 4180

## 2023-06-03 ENCOUNTER — HEALTH MAINTENANCE LETTER (OUTPATIENT)
Age: 38
End: 2023-06-03

## 2023-07-19 ENCOUNTER — HOSPITAL ENCOUNTER (EMERGENCY)
Facility: CLINIC | Age: 38
Discharge: HOME | End: 2023-07-19
Attending: EMERGENCY MEDICINE | Admitting: EMERGENCY MEDICINE
Payer: COMMERCIAL

## 2023-07-19 VITALS
OXYGEN SATURATION: 98 % | HEART RATE: 65 BPM | RESPIRATION RATE: 20 BRPM | TEMPERATURE: 98 F | SYSTOLIC BLOOD PRESSURE: 129 MMHG | DIASTOLIC BLOOD PRESSURE: 80 MMHG

## 2023-07-19 DIAGNOSIS — R51.9 ACUTE NONINTRACTABLE HEADACHE, UNSPECIFIED HEADACHE TYPE: ICD-10-CM

## 2023-07-19 LAB — HCG SERPL QL: NEGATIVE

## 2023-07-19 PROCEDURE — 99284 EMERGENCY DEPT VISIT MOD MDM: CPT | Mod: 25 | Performed by: EMERGENCY MEDICINE

## 2023-07-19 PROCEDURE — 36415 COLL VENOUS BLD VENIPUNCTURE: CPT | Performed by: EMERGENCY MEDICINE

## 2023-07-19 PROCEDURE — 84703 CHORIONIC GONADOTROPIN ASSAY: CPT | Performed by: EMERGENCY MEDICINE

## 2023-07-19 PROCEDURE — 99284 EMERGENCY DEPT VISIT MOD MDM: CPT | Performed by: EMERGENCY MEDICINE

## 2023-07-19 PROCEDURE — 250N000011 HC RX IP 250 OP 636: Performed by: EMERGENCY MEDICINE

## 2023-07-19 PROCEDURE — 96374 THER/PROPH/DIAG INJ IV PUSH: CPT | Performed by: EMERGENCY MEDICINE

## 2023-07-19 PROCEDURE — 258N000003 HC RX IP 258 OP 636: Performed by: EMERGENCY MEDICINE

## 2023-07-19 PROCEDURE — 250N000013 HC RX MED GY IP 250 OP 250 PS 637: Performed by: EMERGENCY MEDICINE

## 2023-07-19 PROCEDURE — 96375 TX/PRO/DX INJ NEW DRUG ADDON: CPT | Performed by: EMERGENCY MEDICINE

## 2023-07-19 PROCEDURE — 96361 HYDRATE IV INFUSION ADD-ON: CPT | Performed by: EMERGENCY MEDICINE

## 2023-07-19 RX ORDER — ACETAMINOPHEN 500 MG
1000 TABLET ORAL ONCE
Status: COMPLETED | OUTPATIENT
Start: 2023-07-19 | End: 2023-07-19

## 2023-07-19 RX ORDER — DIPHENHYDRAMINE HYDROCHLORIDE 50 MG/ML
25 INJECTION INTRAMUSCULAR; INTRAVENOUS ONCE
Status: COMPLETED | OUTPATIENT
Start: 2023-07-19 | End: 2023-07-19

## 2023-07-19 RX ORDER — KETOROLAC TROMETHAMINE 15 MG/ML
15 INJECTION, SOLUTION INTRAMUSCULAR; INTRAVENOUS ONCE
Status: COMPLETED | OUTPATIENT
Start: 2023-07-19 | End: 2023-07-19

## 2023-07-19 RX ADMIN — DIPHENHYDRAMINE HYDROCHLORIDE 25 MG: 50 INJECTION, SOLUTION INTRAMUSCULAR; INTRAVENOUS at 18:15

## 2023-07-19 RX ADMIN — ACETAMINOPHEN 1000 MG: 500 TABLET ORAL at 18:07

## 2023-07-19 RX ADMIN — KETOROLAC TROMETHAMINE 15 MG: 15 INJECTION, SOLUTION INTRAMUSCULAR; INTRAVENOUS at 18:58

## 2023-07-19 RX ADMIN — PROCHLORPERAZINE EDISYLATE 10 MG: 5 INJECTION INTRAMUSCULAR; INTRAVENOUS at 18:15

## 2023-07-19 RX ADMIN — SODIUM CHLORIDE 1000 ML: 9 INJECTION, SOLUTION INTRAVENOUS at 18:14

## 2023-07-19 ASSESSMENT — ACTIVITIES OF DAILY LIVING (ADL): ADLS_ACUITY_SCORE: 35

## 2023-07-19 NOTE — ED PROVIDER NOTES
History     chief complaint  HPI  Patient is a 37-year-old female with a history of a headache disorder with recurrent visual symptoms who is seen neuro-ophthalmology, neurology and has had MRIs and MRV's of her brain along with lumbar punctures who is presenting today for roughly 4 days of her typical headache.  It started with right eye visual changes that have resolved.  It then evolved into a total head headache.  No new numbness/tingling/weakness in arms or legs.  No new thunderclap nature.  No nuchal rigidity.  No fevers.  No chest pain, dyspnea, abdominal pain, vomiting.  This has not been responsive to her outpatient meds.  She tried to call her headache doctor but they were closed.  She does not feel like this headache is significantly different from her prior headaches.    She has been on multiple reproductive assisted medications, but not in the last month as she had a large follicle on her ovary and they were holding off for a month.  She is late for her period.    Review of Systems:  All organ systems below were reviewed and are negative unless indicated in the HPI.    Constitutional  Eyes  ENT  Respiratory  Cardiovascular  Gastrointestinal  Genitourinary  Musculoskeletal  Skin  Neuro    Allergies:  Allergies   Allergen Reactions     Amoxicillin Hives and Swelling     Clindamycin Hives, Swelling, Anaphylaxis and Rash     Tape [Adhesive Tape]        Problem List:    There are no problems to display for this patient.       Past Medical History:    Past Medical History:   Diagnosis Date     Conjunctival cyst of left eye      Connective tissue disease (H)      Headache(784.0)      Scleritis      Unequal pupil diameter        Past Surgical History:    Past Surgical History:   Procedure Laterality Date     DENTAL SURGERY      wisdom teeth extr     TONSILLECTOMY & ADENOIDECTOMY  02/16/2011       Family History:    Family History   Problem Relation Age of Onset     Diabetes Maternal Grandfather       Dementia Maternal Grandfather      Thyroid Disease Maternal Grandmother         hyperthyroid     Chronic Obstructive Pulmonary Disease Maternal Grandmother         smoker     Gallbladder Disease Maternal Grandmother      Aneurysm Paternal Grandmother      Aneurysm Paternal Grandfather      Thyroid Disease Maternal Aunt         hypothyroid       Medications:    aspirin (ASA) 325 MG tablet  Cyanocobalamin (B-12 PO)  EPINEPHrine (ANY BX GENERIC EQUIV) 0.3 MG/0.3ML injection 2-pack  levothyroxine (SYNTHROID/LEVOTHROID) 50 MCG tablet  magnesium oxide (MAG-OX) 400 MG tablet  metFORMIN (GLUCOPHAGE-XR) 750 MG 24 hr tablet  omeprazole (PRILOSEC) 40 MG DR capsule  propranolol ER (INDERAL LA) 120 MG 24 hr capsule          Physical Exam   BP: 129/80  Pulse: 65  Temp: 98  F (36.7  C)  Resp: 20  SpO2: 98 %    Gen: Vital signs reviewed  Eyes: Sclera white, pupils round  ENT: External ears and nares normal  Card: Regular rate and rhythm  Resp: No respiratory distress. Lungs clear to auscultation bilaterally  Abd: Soft, non-distended, non-tender  Extremities: Symmetric distal pulses.  Skin: No rashes  Neuro: Alert, speech normal.  No nuchal rigidity.  Normal tandem gait.  Face is symmetric.  Tongue protrudes midline.  Uvula elevates midline.  Strength intact throughout.  Subjective decrease sensation in the right arm and right leg distally which patient states is not new.  Extraocular eye movements intact.    ED Course        Procedures                Results for orders placed or performed during the hospital encounter of 07/19/23 (from the past 24 hour(s))   HCG qualitative   Result Value Ref Range    hCG Serum Qualitative Negative Negative       Medications   0.9% sodium chloride BOLUS (0 mLs Intravenous Stopped 7/19/23 1915)   acetaminophen (TYLENOL) tablet 1,000 mg (1,000 mg Oral $Given 7/19/23 1807)   prochlorperazine (COMPAZINE) injection 10 mg (10 mg Intravenous $Given 7/19/23 1815)   diphenhydrAMINE (BENADRYL) injection 25  mg (25 mg Intravenous $Given 7/19/23 9748)   ketorolac (TORADOL) injection 15 mg (15 mg Intravenous $Given 7/19/23 4141)         Consultations:  None    Social Determinants of Health:  Presents with     Assessments & Plan (with Medical Decision Making)       I have reviewed the nursing notes.    I have reviewed the findings, diagnosis, plan and need for follow up with the patient.      Medical Decision Making  On arrival, vital signs are normal.  She has a baseline neuro exam.  Low suspicion for cerebral venous sinus thrombosis, pseudotumor cerebri, subarachnoid hemorrhage, meningitis, or other sinister pathology.  I did discuss all these possible differentials with patient.  She does feel like this is like her typical headaches that she has been having for years.  She would just prefer to be treated with migraine cocktail.  I do think this is reasonable given her story and exam.  Pregnancy test obtained.  This was negative.  Given the above medications.  Patient preferred to go home after this and sleep it off.  I discussed return precautions and outpatient follow-up.  She was discharged home in stable condition with instructions to follow-up with her headache doctor.    Final diagnoses:   Acute nonintractable headache, unspecified headache type         Bharathi Bloom M.D.   [facility] Emergency Department     Bharathi Bloom MD  07/19/23 0812

## 2023-07-19 NOTE — ED TRIAGE NOTES
Pt reports a headache that started on Sunday. Pt states pain is her whole head and describes it as a pressure. Pt also reports bilateral leg pain on/off for the past couple months.      Triage Assessment       Row Name 07/19/23 7663       Triage Assessment (Adult)    Airway WDL WDL       Respiratory WDL    Respiratory WDL WDL       Skin Circulation/Temperature WDL    Skin Circulation/Temperature WDL WDL       Cardiac WDL    Cardiac WDL WDL       Peripheral/Neurovascular WDL    Peripheral Neurovascular WDL WDL       Cognitive/Neuro/Behavioral WDL    Cognitive/Neuro/Behavioral WDL X

## 2023-07-20 NOTE — DISCHARGE INSTRUCTIONS
Call your neurologist tomorrow for follow-up.  Return here if you develop new symptoms you find concerning.

## 2023-07-20 NOTE — MEDICATION SCRIBE - ADMISSION MEDICATION HISTORY
Medication Scribe Admission Medication History    Admission medication history is complete. The information provided in this note is only as accurate as the sources available at the time of the update.    Medication reconciliation/reorder completed by provider prior to medication history? No    Information Source(s): Patient via in-person    Pertinent Information: n/a    Changes made to PTA medication list:  Added: B12  Deleted: Saxenda, pred forte eye drops, tobradex eye drops, topamax, trazodone, pen needles  Changed: None    Medication Affordability:  Not including over the counter (OTC) medications, was there a time in the past 3 months when you did not take your medications as prescribed because of cost?: Yes    Allergies reviewed with patient and updates made in EHR: yes    Medication History Completed By: MARTIR LEE 2023 7:13 PM    Prior to Admission medications    Medication Sig Last Dose Taking? Auth Provider Long Term End Date   aspirin (ASA) 325 MG tablet Take 325 mg by mouth At Bedtime 2023 at hs Yes Reported, Patient     Cyanocobalamin (B-12 PO) Take 1 tablet by mouth daily 2023 at am Yes Reported, Patient     EPINEPHrine (ANY BX GENERIC EQUIV) 0.3 MG/0.3ML injection 2-pack Inject 0.3 mg into the muscle once as needed  at  Yes Reported, Patient     levothyroxine (SYNTHROID/LEVOTHROID) 50 MCG tablet Take 50 mcg by mouth daily 2023 at am Yes Reported, Patient Yes    magnesium oxide (MAG-OX) 400 MG tablet Take 400 mg by mouth daily 2023 at am Yes Reported, Patient     metFORMIN (GLUCOPHAGE-XR) 750 MG 24 hr tablet Take 750 mg by mouth 2 times daily (with meals) 2023 at am Yes Reported, Patient Yes    omeprazole (PRILOSEC) 40 MG DR capsule Take 40 mg by mouth At Bedtime 2023 at hs Yes Reported, Patient     propranolol ER (INDERAL LA) 120 MG 24 hr capsule Take 120 mg by mouth daily 2023 at am Yes Reported, Patient Yes

## 2024-07-07 ENCOUNTER — HEALTH MAINTENANCE LETTER (OUTPATIENT)
Age: 39
End: 2024-07-07

## 2025-04-07 ENCOUNTER — MEDICAL CORRESPONDENCE (OUTPATIENT)
Dept: HEALTH INFORMATION MANAGEMENT | Facility: CLINIC | Age: 40
End: 2025-04-07
Payer: COMMERCIAL

## 2025-04-17 ENCOUNTER — TELEPHONE (OUTPATIENT)
Dept: HEMATOLOGY | Facility: CLINIC | Age: 40
End: 2025-04-17
Payer: COMMERCIAL

## 2025-04-17 NOTE — TELEPHONE ENCOUNTER
Incoming call from Gretchen looking to schedule with our center.    Gretchen reports a history of positive lupus anticoagulant (2021) with more recent negative testing. She is newly pregnant (~4wk) and her hematologist Dr. Mcdowell would like her to see one of our hematologists for a 2nd opinion regarding her dx and treatment. Gretchen reports that Dr. Mcdowell has started her on prophylactic Lovenox given her current pregnant state.    Staff assisted in scheduling for June 4th at 1030 with Dr. Longo. Gretchen would like the NPP mailed to her home. She will confirm coverage with her insurance and reach out to our center if questions about her appt arise.    Kathy FULLERN, RN, PHN   [facility]   Office: [phone]  Fax: [phone]

## 2025-06-04 ENCOUNTER — OFFICE VISIT (OUTPATIENT)
Dept: HEMATOLOGY | Facility: CLINIC | Age: 40
End: 2025-06-04
Attending: INTERNAL MEDICINE
Payer: COMMERCIAL

## 2025-06-04 VITALS
OXYGEN SATURATION: 100 % | DIASTOLIC BLOOD PRESSURE: 80 MMHG | WEIGHT: 279.2 LBS | TEMPERATURE: 97.7 F | BODY MASS INDEX: 41.35 KG/M2 | HEIGHT: 69 IN | HEART RATE: 64 BPM | SYSTOLIC BLOOD PRESSURE: 125 MMHG

## 2025-06-04 DIAGNOSIS — D68.62 LUPUS ANTICOAGULANT AFFECTING PREGNANCY IN FIRST TRIMESTER, ANTEPARTUM: Primary | ICD-10-CM

## 2025-06-04 DIAGNOSIS — O99.111 LUPUS ANTICOAGULANT AFFECTING PREGNANCY IN FIRST TRIMESTER, ANTEPARTUM: Primary | ICD-10-CM

## 2025-06-04 PROCEDURE — 99213 OFFICE O/P EST LOW 20 MIN: CPT | Performed by: INTERNAL MEDICINE

## 2025-06-04 RX ORDER — CHOLECALCIFEROL (VITAMIN D3) 50 MCG
1 TABLET ORAL EVERY MORNING
COMMUNITY

## 2025-06-04 RX ORDER — RIBOFLAVIN (VITAMIN B2) 100 MG
100 TABLET ORAL EVERY MORNING
COMMUNITY

## 2025-06-04 RX ORDER — FOLIC ACID 0.4 MG
400 TABLET ORAL
COMMUNITY
Start: 2024-09-16

## 2025-06-04 RX ORDER — LETROZOLE 2.5 MG/1
7.5 TABLET, FILM COATED ORAL
COMMUNITY
Start: 2025-05-27 | End: 2025-06-06

## 2025-06-04 RX ORDER — PROGESTERONE 200 MG/1
200 CAPSULE ORAL
COMMUNITY
Start: 2025-04-16 | End: 2025-06-18

## 2025-06-04 NOTE — PROGRESS NOTES
[facility]  [address] [place] [id number]  Phone: [phone], Fax: [phone]    Outpatient Visit Note:    Patient: Gretchen Dodd  MRN: [id number]  : 1985  PATRICK: 2025     Reason for Consultation:  recurrent miscarriages, possible APS    Assessment: Gretchen Dodd is a 39 year old woman with a history of possible mixed connective tissue disease and recurrent miscarriages.  While she has had a single positive lupus inhibitor test in the past, she does not meet the diagnostic criteria for APS especially since her most recent testing is all negative.  However, given that she has had recurrent miscarriage without a clear cause, now pursuing IVF, I do think it is reasonable to add aspirin and enoxaparin especially early in the pregnancy is when she has tended to have miscarriages, to hopefully improve the likelihood of a live birth.  I explained to her that while there is strong evidence for use of aspirin and enoxaparin in recurrent miscarriages related to antiphospholipid antibody syndrome, there is less certainty about other women experiencing recurrent miscarriages.  Since she does have an autoimmune history, I do think it is reasonable to try this therapy given its very low risk to her and high stakes.  I would defer whether she should initiate prior to implantation or after to the team managing her IVF.    Recommendations:  I counseled the patient about my assessment of risks and benefits of anticoagulation during a trial of IVF for other pregnancies as noted above  I think it is reasonable for her to use aspirin 81 mg and enoxaparin 40 mg daily given the very low risk of the strategy and potential to improve the likelihood of a live birth.  I have no concerns about the medications she would be taking including estrogens, for IVF.  She has never had a thrombotic event and her most recent APS testing is negative which is reassuring.  Will be happy  to see her back on an as-needed basis.    50 minutes spent by me on the date of the encounter doing chart review, review of outside records, review of test results, interpretation of tests, patient visit, and documentation     Jian Longo MD  Associate Professor of Medicine, Division of Hematology, Oncology and Transplantation  [employer] [facility] [employer]     -------------------------------  History: Gretchen Dodd is a 39 year old essentially healthy woman who presents today for discussion on anticoagulation during pregnancy.  She has a long history of miscarriages, unfortunately.  She reports that most of these miscarriages have been very early in the first trimester, within the first several weeks.  She has never taken enoxaparin until her most recent pregnancy, when she only took a couple of doses before she had a miscarriage.  She has a history of mixed connective tissue disorder, though she notes she stopped taking medicine for this due to side effects.  In reviewing her laboratory test, she does have a history of positive lupus inhibitor test, with only very minimally elevated ratios as noted below in laboratory.  She has never had a cardiolipin or beta-2 like a protein antibodies.  Most recently she was seen by a local hematologist who repeated her tests this time were negative.  Hence, there is question about whether she benefits from anticoagulation during this pregnancy whether she has obstetric APS.    Today, she reports she is feeling well.  She is feeling anxious about proceeding with IVF, but is working with a clinic in [place] and expecting implantation soon.  She has a list of medicines today she wants to review for safety before proceeding.  She has enoxaparin injections but is not currently taking those.  She did have significant ecchymoses from this but no other issues.  She has never had a VTE.  She has never taken anticoagulation for other reasons.    Current Outpatient  Medications   Medication Sig Dispense Refill    aspirin (ASA) 325 MG tablet Take 325 mg by mouth At Bedtime      Coenzyme Q10 (COQ10 PO) Take by mouth.      EPINEPHrine (ANY BX GENERIC EQUIV) 0.3 MG/0.3ML injection 2-pack Inject 0.3 mg into the muscle once as needed      folic acid (FOLVITE) 400 MCG tablet Take 400 mcg by mouth.      letrozole (FEMARA) 2.5 MG tablet Take 7.5 mg by mouth.      levothyroxine (SYNTHROID/LEVOTHROID) 50 MCG tablet Take 50 mcg by mouth daily      metFORMIN (GLUCOPHAGE-XR) 750 MG 24 hr tablet Take 500 mg by mouth 2 times daily (with meals).      Nerve Stimulator (NERIVIO) MICKEY USE ONE 45 MINUTE TREATMENT EVERY OTHER DAY FOR PREVENTION OR AT ONSET OF MIGRAINE      Omega-3 Fatty Acids (FISH OIL PO) Take by mouth.      omeprazole (PRILOSEC) 40 MG DR capsule Take 40 mg by mouth At Bedtime      Prenatal Vit-Fe Fumarate-FA (PRENATAL PO) Take by mouth.      progesterone (PROMETRIUM) 200 MG capsule Place 200 mg vaginally.      propranolol ER (INDERAL LA) 120 MG 24 hr capsule Take 120 mg by mouth daily      vitamin B-2 (RIBOFLAVIN) 100 MG TABS tablet Take 100 mg by mouth every morning.      VITAMIN D3 50 MCG (2000 UT) tablet Take 1 tablet by mouth every morning.      Enoxaparin Sodium (LOVENOX SC) Inject subcutaneously. (Patient not taking: Reported on 6/4/2025)       No current facility-administered medications for this visit.       Physical Exam:  Vitals: B/P: 125/80, T: 97.7, P: 64, R: Data Unavailable, Wt: 279 lbs 3.2 oz Body mass index is 41.83 kg/m .   Exam:   Gen: Appears well, no distress  Ext: no edema    Labs:  April 1, 2025 beta-2 glycoprotein 1 antibodies were negative    She had positive lupus inhibitor test in April 2021, September 2021 and December 2021.  However all of these were minimally elevated, and most recent testing in April 2025 all tests are negative.    Imaging:  none

## 2025-06-04 NOTE — LETTER
6/4/2025       RE: Gretchen Dodd  [address]  [place] [address]     Dear Colleague,    Thank you for referring your patient, Gretchen Dodd, to the [facility].    During my visit with Gretchen we discussed her history of recurrent miscarriages and testing for antiphospholipid antibody syndrome.  She currently does not strictly meet the diagnostic criteria for this disorder.    First, regarding use of Aspirin and enoxaparin (Lovenox) during the pregnancy, while she does not meet the criteria for the diagnosis of APS, there is still some uncertainty in whether some patients with miscarriages benefit from this treatment so while I do think it is necessary, I see no contraindication for a trial of this strategy given the high stakes of IVF and minimal risk to the patient with this treatment.  I will defer to your standard of care for whether starting this treatment before or after implantation.    Second, I reviewed the list of medications for retrievals and transfers you provided her.  Given her absence of any venous or arterial thrombotic events, I see no contraindication to any of these medications you use as your standard of care for IVF.      Again, thank you for allowing me to participate in the care of your patient.      Sincerely,      Jian Longo MD

## 2025-06-24 ENCOUNTER — TELEPHONE (OUTPATIENT)
Dept: HEMATOLOGY | Facility: CLINIC | Age: 40
End: 2025-06-24
Payer: COMMERCIAL

## 2025-06-24 NOTE — TELEPHONE ENCOUNTER
[id number]  Gretchen Dodd  39 year old female  [facility] Diagnosis: recurrent miscarriage  [facility] Provider: Dr. Longo    Incoming call form Gretchen with questions about her ASA and Lovenox use.    Gretchen states that she is continuing with IVF and her recent cycle was unsuccessful. She plans to continue to attempt conception with IVF after her period. Given the timeline of having her period and resuming IVF medications, she feels it would be easier to just stay on her ASA and Lovenox instead of starting and stopping it. She wants to make sure that's ok.    Staff reviewed with Gretchen that this is a fine plan. Discussed that if she were to have heavier bleeding with her period, she could consider to hold her AC on the heaviest days. Reviewed s/s of nuisance bleeding and invited her to reach out with questions prn.    Kathy FULLERN, RN, PHN   [facility]   Office: [phone]  Fax: [phone]

## 2025-08-18 ENCOUNTER — PATIENT OUTREACH (OUTPATIENT)
Dept: CARE COORDINATION | Facility: CLINIC | Age: 40
End: 2025-08-18
Payer: COMMERCIAL